# Patient Record
Sex: MALE | Race: WHITE | NOT HISPANIC OR LATINO
[De-identification: names, ages, dates, MRNs, and addresses within clinical notes are randomized per-mention and may not be internally consistent; named-entity substitution may affect disease eponyms.]

---

## 2022-11-15 ENCOUNTER — APPOINTMENT (OUTPATIENT)
Dept: ORTHOPEDIC SURGERY | Facility: CLINIC | Age: 72
End: 2022-11-15

## 2022-11-28 ENCOUNTER — APPOINTMENT (OUTPATIENT)
Dept: ORTHOPEDIC SURGERY | Facility: CLINIC | Age: 72
End: 2022-11-28

## 2022-12-02 ENCOUNTER — APPOINTMENT (OUTPATIENT)
Dept: ORTHOPEDIC SURGERY | Facility: CLINIC | Age: 72
End: 2022-12-02

## 2023-10-04 ENCOUNTER — EMERGENCY (EMERGENCY)
Facility: HOSPITAL | Age: 73
LOS: 0 days | Discharge: LEFT BEFORE DISCHARGE | End: 2023-10-04
Attending: STUDENT IN AN ORGANIZED HEALTH CARE EDUCATION/TRAINING PROGRAM
Payer: MEDICARE

## 2023-10-04 VITALS
HEART RATE: 158 BPM | OXYGEN SATURATION: 98 % | DIASTOLIC BLOOD PRESSURE: 133 MMHG | RESPIRATION RATE: 18 BRPM | TEMPERATURE: 99 F | HEIGHT: 72 IN | SYSTOLIC BLOOD PRESSURE: 169 MMHG | WEIGHT: 190.04 LBS

## 2023-10-04 VITALS
DIASTOLIC BLOOD PRESSURE: 106 MMHG | TEMPERATURE: 98 F | OXYGEN SATURATION: 97 % | SYSTOLIC BLOOD PRESSURE: 147 MMHG | RESPIRATION RATE: 15 BRPM | HEART RATE: 100 BPM

## 2023-10-04 DIAGNOSIS — R10.32 LEFT LOWER QUADRANT PAIN: ICD-10-CM

## 2023-10-04 DIAGNOSIS — M54.50 LOW BACK PAIN, UNSPECIFIED: ICD-10-CM

## 2023-10-04 DIAGNOSIS — I48.91 UNSPECIFIED ATRIAL FIBRILLATION: ICD-10-CM

## 2023-10-04 DIAGNOSIS — S72.009A FRACTURE OF UNSPECIFIED PART OF NECK OF UNSPECIFIED FEMUR, INITIAL ENCOUNTER FOR CLOSED FRACTURE: Chronic | ICD-10-CM

## 2023-10-04 DIAGNOSIS — Z86.19 PERSONAL HISTORY OF OTHER INFECTIOUS AND PARASITIC DISEASES: ICD-10-CM

## 2023-10-04 DIAGNOSIS — Z53.29 PROCEDURE AND TREATMENT NOT CARRIED OUT BECAUSE OF PATIENT'S DECISION FOR OTHER REASONS: ICD-10-CM

## 2023-10-04 DIAGNOSIS — Z87.81 PERSONAL HISTORY OF (HEALED) TRAUMATIC FRACTURE: ICD-10-CM

## 2023-10-04 DIAGNOSIS — R00.0 TACHYCARDIA, UNSPECIFIED: ICD-10-CM

## 2023-10-04 DIAGNOSIS — I48.92 UNSPECIFIED ATRIAL FLUTTER: ICD-10-CM

## 2023-10-04 DIAGNOSIS — Z87.2 PERSONAL HISTORY OF DISEASES OF THE SKIN AND SUBCUTANEOUS TISSUE: ICD-10-CM

## 2023-10-04 DIAGNOSIS — Z20.822 CONTACT WITH AND (SUSPECTED) EXPOSURE TO COVID-19: ICD-10-CM

## 2023-10-04 DIAGNOSIS — F11.20 OPIOID DEPENDENCE, UNCOMPLICATED: ICD-10-CM

## 2023-10-04 LAB
ALBUMIN SERPL ELPH-MCNC: 3.5 G/DL — SIGNIFICANT CHANGE UP (ref 3.3–5)
ALP SERPL-CCNC: 193 U/L — HIGH (ref 40–120)
ALT FLD-CCNC: 28 U/L — SIGNIFICANT CHANGE UP (ref 12–78)
ANION GAP SERPL CALC-SCNC: 8 MMOL/L — SIGNIFICANT CHANGE UP (ref 5–17)
APPEARANCE UR: CLEAR — SIGNIFICANT CHANGE UP
AST SERPL-CCNC: 30 U/L — SIGNIFICANT CHANGE UP (ref 15–37)
BASOPHILS # BLD AUTO: 0.02 K/UL — SIGNIFICANT CHANGE UP (ref 0–0.2)
BASOPHILS NFR BLD AUTO: 0.2 % — SIGNIFICANT CHANGE UP (ref 0–2)
BILIRUB SERPL-MCNC: 1.1 MG/DL — SIGNIFICANT CHANGE UP (ref 0.2–1.2)
BILIRUB UR-MCNC: NEGATIVE — SIGNIFICANT CHANGE UP
BUN SERPL-MCNC: 9 MG/DL — SIGNIFICANT CHANGE UP (ref 7–23)
CALCIUM SERPL-MCNC: 8.9 MG/DL — SIGNIFICANT CHANGE UP (ref 8.5–10.1)
CHLORIDE SERPL-SCNC: 97 MMOL/L — SIGNIFICANT CHANGE UP (ref 96–108)
CO2 SERPL-SCNC: 31 MMOL/L — SIGNIFICANT CHANGE UP (ref 22–31)
COLOR SPEC: YELLOW — SIGNIFICANT CHANGE UP
CREAT SERPL-MCNC: 0.82 MG/DL — SIGNIFICANT CHANGE UP (ref 0.5–1.3)
DIFF PNL FLD: NEGATIVE — SIGNIFICANT CHANGE UP
EGFR: 93 ML/MIN/1.73M2 — SIGNIFICANT CHANGE UP
EOSINOPHIL # BLD AUTO: 0.02 K/UL — SIGNIFICANT CHANGE UP (ref 0–0.5)
EOSINOPHIL NFR BLD AUTO: 0.2 % — SIGNIFICANT CHANGE UP (ref 0–6)
GLUCOSE SERPL-MCNC: 92 MG/DL — SIGNIFICANT CHANGE UP (ref 70–99)
GLUCOSE UR QL: NEGATIVE MG/DL — SIGNIFICANT CHANGE UP
HCT VFR BLD CALC: 45.6 % — SIGNIFICANT CHANGE UP (ref 39–50)
HGB BLD-MCNC: 15.5 G/DL — SIGNIFICANT CHANGE UP (ref 13–17)
IMM GRANULOCYTES NFR BLD AUTO: 0.5 % — SIGNIFICANT CHANGE UP (ref 0–0.9)
INR BLD: 1.02 RATIO — SIGNIFICANT CHANGE UP (ref 0.85–1.18)
KETONES UR-MCNC: ABNORMAL
LACTATE SERPL-SCNC: 2 MMOL/L — SIGNIFICANT CHANGE UP (ref 0.7–2)
LEUKOCYTE ESTERASE UR-ACNC: NEGATIVE — SIGNIFICANT CHANGE UP
LIDOCAIN IGE QN: 20 U/L — SIGNIFICANT CHANGE UP (ref 13–75)
LYMPHOCYTES # BLD AUTO: 1.47 K/UL — SIGNIFICANT CHANGE UP (ref 1–3.3)
LYMPHOCYTES # BLD AUTO: 18.3 % — SIGNIFICANT CHANGE UP (ref 13–44)
MAGNESIUM SERPL-MCNC: 2.2 MG/DL — SIGNIFICANT CHANGE UP (ref 1.6–2.6)
MCHC RBC-ENTMCNC: 32.4 PG — SIGNIFICANT CHANGE UP (ref 27–34)
MCHC RBC-ENTMCNC: 34 G/DL — SIGNIFICANT CHANGE UP (ref 32–36)
MCV RBC AUTO: 95.2 FL — SIGNIFICANT CHANGE UP (ref 80–100)
MONOCYTES # BLD AUTO: 1.15 K/UL — HIGH (ref 0–0.9)
MONOCYTES NFR BLD AUTO: 14.3 % — HIGH (ref 2–14)
NEUTROPHILS # BLD AUTO: 5.34 K/UL — SIGNIFICANT CHANGE UP (ref 1.8–7.4)
NEUTROPHILS NFR BLD AUTO: 66.5 % — SIGNIFICANT CHANGE UP (ref 43–77)
NITRITE UR-MCNC: NEGATIVE — SIGNIFICANT CHANGE UP
NRBC # BLD: 0 /100 WBCS — SIGNIFICANT CHANGE UP (ref 0–0)
PH UR: 7 — SIGNIFICANT CHANGE UP (ref 5–8)
PLATELET # BLD AUTO: 219 K/UL — SIGNIFICANT CHANGE UP (ref 150–400)
POTASSIUM SERPL-MCNC: 3.5 MMOL/L — SIGNIFICANT CHANGE UP (ref 3.5–5.3)
POTASSIUM SERPL-SCNC: 3.5 MMOL/L — SIGNIFICANT CHANGE UP (ref 3.5–5.3)
PROT SERPL-MCNC: 7.9 GM/DL — SIGNIFICANT CHANGE UP (ref 6–8.3)
PROT UR-MCNC: NEGATIVE MG/DL — SIGNIFICANT CHANGE UP
PROTHROM AB SERPL-ACNC: 12.1 SEC — SIGNIFICANT CHANGE UP (ref 9.5–13)
RAPID RVP RESULT: SIGNIFICANT CHANGE UP
RBC # BLD: 4.79 M/UL — SIGNIFICANT CHANGE UP (ref 4.2–5.8)
RBC # FLD: 12.4 % — SIGNIFICANT CHANGE UP (ref 10.3–14.5)
SARS-COV-2 RNA SPEC QL NAA+PROBE: SIGNIFICANT CHANGE UP
SODIUM SERPL-SCNC: 136 MMOL/L — SIGNIFICANT CHANGE UP (ref 135–145)
SP GR SPEC: 1.01 — SIGNIFICANT CHANGE UP (ref 1.01–1.02)
T4 AB SER-ACNC: 9.8 UG/DL — SIGNIFICANT CHANGE UP (ref 4.6–12)
TROPONIN I, HIGH SENSITIVITY RESULT: 8.7 NG/L — SIGNIFICANT CHANGE UP
TSH SERPL-MCNC: 1.26 UIU/ML — SIGNIFICANT CHANGE UP (ref 0.36–3.74)
UROBILINOGEN FLD QL: NEGATIVE MG/DL — SIGNIFICANT CHANGE UP
WBC # BLD: 8.04 K/UL — SIGNIFICANT CHANGE UP (ref 3.8–10.5)
WBC # FLD AUTO: 8.04 K/UL — SIGNIFICANT CHANGE UP (ref 3.8–10.5)

## 2023-10-04 PROCEDURE — 99284 EMERGENCY DEPT VISIT MOD MDM: CPT

## 2023-10-04 PROCEDURE — 93010 ELECTROCARDIOGRAM REPORT: CPT | Mod: 76

## 2023-10-04 PROCEDURE — 99291 CRITICAL CARE FIRST HOUR: CPT

## 2023-10-04 PROCEDURE — 71045 X-RAY EXAM CHEST 1 VIEW: CPT | Mod: 26

## 2023-10-04 RX ORDER — CYCLOBENZAPRINE HYDROCHLORIDE 10 MG/1
1 TABLET, FILM COATED ORAL
Qty: 15 | Refills: 0
Start: 2023-10-04 | End: 2023-10-08

## 2023-10-04 RX ORDER — ACETAMINOPHEN 500 MG
975 TABLET ORAL ONCE
Refills: 0 | Status: DISCONTINUED | OUTPATIENT
Start: 2023-10-04 | End: 2023-10-04

## 2023-10-04 RX ORDER — ACETAMINOPHEN 500 MG
1000 TABLET ORAL ONCE
Refills: 0 | Status: COMPLETED | OUTPATIENT
Start: 2023-10-04 | End: 2023-10-04

## 2023-10-04 RX ORDER — DILTIAZEM HCL 120 MG
10 CAPSULE, EXT RELEASE 24 HR ORAL ONCE
Refills: 0 | Status: COMPLETED | OUTPATIENT
Start: 2023-10-04 | End: 2023-10-04

## 2023-10-04 RX ORDER — SODIUM CHLORIDE 9 MG/ML
1000 INJECTION INTRAMUSCULAR; INTRAVENOUS; SUBCUTANEOUS ONCE
Refills: 0 | Status: DISCONTINUED | OUTPATIENT
Start: 2023-10-04 | End: 2023-10-04

## 2023-10-04 RX ORDER — METOPROLOL TARTRATE 50 MG
1 TABLET ORAL
Qty: 60 | Refills: 0
Start: 2023-10-04 | End: 2023-11-02

## 2023-10-04 RX ORDER — PIPERACILLIN AND TAZOBACTAM 4; .5 G/20ML; G/20ML
3.38 INJECTION, POWDER, LYOPHILIZED, FOR SOLUTION INTRAVENOUS ONCE
Refills: 0 | Status: COMPLETED | OUTPATIENT
Start: 2023-10-04 | End: 2023-10-04

## 2023-10-04 RX ORDER — SODIUM CHLORIDE 9 MG/ML
2400 INJECTION, SOLUTION INTRAVENOUS ONCE
Refills: 0 | Status: COMPLETED | OUTPATIENT
Start: 2023-10-04 | End: 2023-10-04

## 2023-10-04 RX ORDER — DILTIAZEM HCL 120 MG
20 CAPSULE, EXT RELEASE 24 HR ORAL ONCE
Refills: 0 | Status: COMPLETED | OUTPATIENT
Start: 2023-10-04 | End: 2023-10-04

## 2023-10-04 RX ORDER — DILTIAZEM HCL 120 MG
60 CAPSULE, EXT RELEASE 24 HR ORAL ONCE
Refills: 0 | Status: COMPLETED | OUTPATIENT
Start: 2023-10-04 | End: 2023-10-04

## 2023-10-04 RX ADMIN — PIPERACILLIN AND TAZOBACTAM 200 GRAM(S): 4; .5 INJECTION, POWDER, LYOPHILIZED, FOR SOLUTION INTRAVENOUS at 09:35

## 2023-10-04 RX ADMIN — Medication 60 MILLIGRAM(S): at 11:06

## 2023-10-04 RX ADMIN — PIPERACILLIN AND TAZOBACTAM 3.38 GRAM(S): 4; .5 INJECTION, POWDER, LYOPHILIZED, FOR SOLUTION INTRAVENOUS at 10:05

## 2023-10-04 RX ADMIN — Medication 1000 MILLIGRAM(S): at 08:15

## 2023-10-04 RX ADMIN — Medication 10 MILLIGRAM(S): at 09:45

## 2023-10-04 RX ADMIN — Medication 400 MILLIGRAM(S): at 08:00

## 2023-10-04 RX ADMIN — Medication 20 MILLIGRAM(S): at 10:16

## 2023-10-04 RX ADMIN — SODIUM CHLORIDE 2400 MILLILITER(S): 9 INJECTION, SOLUTION INTRAVENOUS at 09:05

## 2023-10-04 RX ADMIN — SODIUM CHLORIDE 2400 MILLILITER(S): 9 INJECTION, SOLUTION INTRAVENOUS at 08:05

## 2023-10-04 NOTE — ED PROVIDER NOTE - CRITICAL CARE ATTENDING CONTRIBUTION TO CARE
Upon my evaluation, this patient had a high probability of imminent or life-threatening deterioration due to atrial flutter w/ RVR, sepsis, which required my direct attention, intervention, and personal management.  The patient has a  medical condition that impairs one or more vital organ systems.  Frequent personal assessment and adjustment of medical interventions was performed.      I have personally provided 30 minutes of critical care time exclusive of time spent on separately billable procedures. Time includes review of laboratory data, radiology results, discussion with consultants, patient and family; monitoring for potential decompensation, as well as time spent retrieving data and reviewing the chart and documenting the visit. Interventions were performed as documented above.

## 2023-10-04 NOTE — ED PROVIDER NOTE - CARE PROVIDER_API CALL
Agus Shrestha  Cardiology  [address] [place] [address]  Phone: [phone]  Fax: [phone]  Follow Up Time: 1-3 Days

## 2023-10-04 NOTE — ED PROVIDER NOTE - NSFOLLOWUPINSTRUCTIONS_ED_ALL_ED_FT
Followup with cardiologist in the next 1-3 days.     Palpitations    A palpitation is the feeling that your heartbeat is irregular or is faster than normal. It may feel like your heart is fluttering or skipping a beat. They may be caused by many things, including smoking, caffeine, alcohol, stress, and certain medicines. Although most causes of palpitations are not serious, palpitations can be a sign of a serious medical problem. Avoid caffeine, alcohol, and tobacco products at home. Try to reduce stress and anxiety and make sure to get plenty of rest.     SEEK IMMEDIATE MEDICAL CARE IF YOU HAVE ANY OF THE FOLLOWING SYMPTOMS: chest pain, shortness of breath, severe headache, dizziness/lightheadedness, or fainting.   Please return to the emergency department immediately should you feel worse in any way or have any of the following symptoms:    •	especially increased or different pain  •	 fevers  •	persistent vomiting  •	shaking chills     Please follow up with the Doctor listed within the time frame specified. Thank you for coming to the emergency department. We hope you are feeling improved and continue to get better. Have a nice day.

## 2023-10-04 NOTE — CONSULT NOTE ADULT - ASSESSMENT
73 years old male with h/o methadone dependence present to ED with complain of low back pain radiage to left groin area, which happen intermittently for last 1 week. Pain is slightly worsened with ambulation or certain position. Denied any fever, chills, nausea, vomiting, diarrhea. No urinary symptoms.  No respiratory symptoms  Slightly hypertensive, tachycardic to 158 in ED. EKG appear Afib/flutter with variable block. Some improvement in HR with multiple dose of diltiazem. Had one episode of low grade fever of 100.4. No leukocytosis, plt 219, lactate 2, K 3.5, Cr 0.82. UA negative for UTI. RVP negative. CXR  ( I personally review) with no focal consolidation      Initial EKG and repeat EKG reviewed  ( I personally review), appear to have Afib/Flutter with variable block. Patient denied any h/o CAD or CVA. No chest pain on exertion. No palpitation. Still have intermittent tachycardic to 120s range. Discussed patient the need of admission for further HR control, work up for underlying cardiac pathology and low grade fever. Patient stated that he dose not want to stay and want to leave hospital. Explained patient that it will be against medical advise if he leaves and I strongly recommend against it. Risk of leaving AMA including but not limiting to infection, sepsis, syncope, cardiac arrhythmia and possible death were all explained to patient. Patient expressed verbal standing and still insists to leave AMA. AMA form signed, witness by ED team and placed in chart. CHADVASE 1 at lease for age. Inform patient that at least he should be on aspirin, which he has at home and he said he would take it. Metoprolol 25mg bid sent to patient's preferred pharmacy.   Discussed with ED team

## 2023-10-04 NOTE — CONSULT NOTE ADULT - SUBJECTIVE AND OBJECTIVE BOX
73 years old male with h/o methadone dependence present to ED with complain of low back pain radiage to left groin area, which happen intermittently for last 1 week. Pain is slightly worsened with ambulation or certain position. Denied any fever, chills, nausea, vomiting, diarrhea. No urinary symptoms.   Slightly hypertensive, tachycardic to 158 in ED. EKG appear Afib/flutter with variable block. Some improvement in HR with multiple dose of diltiazem. Had one episode of low grade fever of 100.4. No leukocytosis, plt 219, lactate 2, K 3.5, Cr 0.82. UA negative for UTI. RVP negative. CXR with no focal consolidation    SH: no toxic habits. H/o opiate use 50 years ago on methadone  FH: no family h/o HTN, DM, CAD    ROS: All ROS were negative except intermittent low back pain radiate to left groin    Physical Exam  CONSTITUTIONAL: alert and cooperative, no acute distress. Tachycardic  EYES: PERRL, no scleral icterus  ENT: Mucosa moist, tongue normal  NECK: Neck supple, trachea midline, non-tender  CARDIAC:  Irregular HR. No murmurs. No Pedal edema  LUNGS: Equal air entry both lungs. No rales, rhonchi, wheezing. Normal respiratory effort.   ABDOMEN: Soft, nondistended, nontender. No guarding or rebound tenderness. No hepatomegaly or splenomegaly. Bowel sound normal.  MUSCULOSKELETAL: Normocephalic, atraumatic. Spine normal without deformity or tenderness, no CVA tenderness. No significant deformity or joint abnormality.   NEUROLOGICAL: No gross motor or sensory deficits  PSYCHIATRIC: A&O x 3, appropriate mood and affect.

## 2023-10-04 NOTE — ED ADULT TRIAGE NOTE - CHIEF COMPLAINT QUOTE
left hip pain radiating to his left groin area, denies any trauma, reports taking water pill. Tachycardia at 156 in triage.

## 2023-10-04 NOTE — ED ADULT NURSE NOTE - NSFALLRISKINTERV_ED_ALL_ED

## 2023-10-04 NOTE — ED PROVIDER NOTE - CLINICAL SUMMARY MEDICAL DECISION MAKING FREE TEXT BOX
Patient is normotensive, well-appearing.  ANO x3.  Tachycardic to the 150s on arrival.  Rectal exam 100.4 temperature.  P waves visualized on EKG, consider sinus tachycardia.  Consider atrial flutter, 2-1 block as well given persistent heart rate in the 150s with some fluctuance down into the 120s.  Will give antipyretics, 30 cc/kg fluid bolus as part of sepsis work-up.  Will give rate control medicine as needed if heart rate does not improve with fluids and antipyretics.  Will treat underlying source.  Abdomen is benign on my exam, soft, nondistended, no tenderness.  Consider colitis, viral syndrome, kidney stone.

## 2023-10-04 NOTE — ED PROVIDER NOTE - PATIENT PORTAL LINK FT
You can access the FollowMyHealth Patient Portal offered by [facility] by registering at the following website: [email or web address]. By joining [employer]’s FollowMyHealth portal, you will also be able to view your health information using other applications (apps) compatible with our system.

## 2023-10-04 NOTE — ED ADULT NURSE NOTE - OBJECTIVE STATEMENT
72 yo male, A&Ox4, presents to ED c/o L lower quadrant, left hip pain radiating to his left groin area, denies any injury/ trauma, reports taking water pill for swelling to RENE LE. Tachycardic and febrile on arrival. Denies n/v/diarrhea sob, chest pain, dizziness, cough. Takes methadone.

## 2023-10-04 NOTE — ED PROVIDER NOTE - OBJECTIVE STATEMENT
73-year-old male history of lymphedema of lower extremities presents left lower quadrant abdominal pain for the past 1 week.  Patient states he has had back pain bilateral nature for the past 2 weeks.  Endorsing feeling knot-like sensation in the left lower quadrant of the abdomen.  States that he believes it is from indigestion.  Passing bowel movements, denies nausea or vomiting.  Denies chest pain or shortness of breath.  Denies dysuria.  Denies trauma or falls.  Denies urinary or fecal incontinence.  Patient does endorse taking methadone daily.  Denies cough, runny nose.  Denies any chest pain or palpitations currently.

## 2023-10-04 NOTE — ED PROVIDER NOTE - PHYSICAL EXAMINATION
General: Well appearing male in no acute distress  HEENT: Normocephalic, atraumatic. Moist mucous membranes. Oropharynx clear. No lymphadenopathy.  Eyes: No scleral icterus. EOMI. ELINOR.  Neck:. Soft and supple. Full ROM without pain. No midline tenderness  Cardiac: Regular rate and regular rhythm. No murmurs, rubs, gallops. Peripheral pulses 2+ and symmetric. No LE edema.  Resp: Lungs CTAB. Speaking in full sentences. No wheezes, rales or rhonchi.  Abd: Soft, non-tender, non-distended. No guarding or rebound. No scars, masses, or lesions.  Back: Spine midline and non-tender. No CVA tenderness.    Skin: No rashes, abrasions, or lacerations.  Neuro: AO x 3. Moves all extremities symmetrically. Motor strength and sensation grossly intact.

## 2023-10-04 NOTE — ED PROVIDER NOTE - PROGRESS NOTE DETAILS
patient refusing CT scan, states he does not want to lie flat for the CT scan. informed patient it is a test we recommend to find out what is going on with his abdomen. abdominal exam is soft, non-tender, nondistended but he was endorsing LLQ abdominal pain. patient made aware of benefits/risks of CT scan, he is A&Ox3 and able to make an informed decision. HR still elevated s/p 1L fluid bolus, will give dilt for rate control.   -Mendez spoke to Dr. stevenson, hospitalist. repeat EKG improved, HR fluctuates between 90s-120s. Dr. Call and myself at bedside. patient requesting to AMA. A&ox3. despite multiple efforts to convince patient to be admitted, patient is refusing. states he will followup with primary care doctor/cardiologist on his own. DR. call to send medicine to his pharmacy. he is made aware of benefits/risks and is able to make an informed decision. patient has no chest pain/sob/palpitations currently.   The pt has demonstrated concrete thinking/reasoning, has maintained an orderly/reasonable conversation, appears to have intact insight/judgment/reason and therefore in our opinion has capacity to make decisions. The pt verbalized an understanding of our worries. We’ve told the patient that the hospital evaluation is incomplete & many troublesome conditions haven’t  been r/o. We have discussed the need for further inpatient w/u so we can get more information. We have discussed the range of possible dx, potential testing & tx options. We’ve made  numerous efforts to prevent the pt from leaving AMA.  Our discussions included the potential outcomes of leaving AMA, including worsening of their condition, becoming permanently disabled/in pain/critically ill, or death.  Despite these efforts, we were unable to convince the pt to stay. The pt is refusing any  further care and is leaving against medical advice. We have attempted to offer tx/rx/guidance for any dangerous conditions which are most likely and/or dangerous.  We have answered all questions and have implored the pt to return ASAP to complete the w/u.

## 2023-10-04 NOTE — ED ADULT TRIAGE NOTE - BMI (KG/M2)
- likely second to septic shock and decreased po intake  - 2.8L fluid given  - strict I/O  - avoid nephrotoxic meds  - renal US if renal function worsening  - BMP monitoring daily  - UA  - Urine Na, Cr, Urea ordered   25.8

## 2023-10-05 LAB
CULTURE RESULTS: NO GROWTH — SIGNIFICANT CHANGE UP
SPECIMEN SOURCE: SIGNIFICANT CHANGE UP

## 2023-10-09 LAB
CULTURE RESULTS: SIGNIFICANT CHANGE UP
CULTURE RESULTS: SIGNIFICANT CHANGE UP
SPECIMEN SOURCE: SIGNIFICANT CHANGE UP
SPECIMEN SOURCE: SIGNIFICANT CHANGE UP

## 2023-11-01 ENCOUNTER — APPOINTMENT (OUTPATIENT)
Dept: CARDIOLOGY | Facility: CLINIC | Age: 73
End: 2023-11-01

## 2023-12-07 ENCOUNTER — INPATIENT (INPATIENT)
Facility: HOSPITAL | Age: 73
LOS: 3 days | Discharge: HOME | End: 2023-12-11
Attending: INTERNAL MEDICINE | Admitting: INTERNAL MEDICINE
Payer: MEDICARE

## 2023-12-07 VITALS
DIASTOLIC BLOOD PRESSURE: 107 MMHG | SYSTOLIC BLOOD PRESSURE: 136 MMHG | TEMPERATURE: 98 F | WEIGHT: 179.9 LBS | HEART RATE: 148 BPM | RESPIRATION RATE: 20 BRPM | HEIGHT: 72 IN | OXYGEN SATURATION: 97 %

## 2023-12-07 DIAGNOSIS — S72.009A FRACTURE OF UNSPECIFIED PART OF NECK OF UNSPECIFIED FEMUR, INITIAL ENCOUNTER FOR CLOSED FRACTURE: Chronic | ICD-10-CM

## 2023-12-07 LAB
ALBUMIN SERPL ELPH-MCNC: 3.3 G/DL — SIGNIFICANT CHANGE UP (ref 3.3–5)
ALBUMIN SERPL ELPH-MCNC: 3.3 G/DL — SIGNIFICANT CHANGE UP (ref 3.3–5)
ALP SERPL-CCNC: 125 U/L — HIGH (ref 40–120)
ALP SERPL-CCNC: 125 U/L — HIGH (ref 40–120)
ALT FLD-CCNC: 30 U/L — SIGNIFICANT CHANGE UP (ref 12–78)
ALT FLD-CCNC: 30 U/L — SIGNIFICANT CHANGE UP (ref 12–78)
ANION GAP SERPL CALC-SCNC: 8 MMOL/L — SIGNIFICANT CHANGE UP (ref 5–17)
ANION GAP SERPL CALC-SCNC: 8 MMOL/L — SIGNIFICANT CHANGE UP (ref 5–17)
APTT BLD: 37.5 SEC — HIGH (ref 24.5–35.6)
APTT BLD: 37.5 SEC — HIGH (ref 24.5–35.6)
AST SERPL-CCNC: 31 U/L — SIGNIFICANT CHANGE UP (ref 15–37)
AST SERPL-CCNC: 31 U/L — SIGNIFICANT CHANGE UP (ref 15–37)
BILIRUB SERPL-MCNC: 0.7 MG/DL — SIGNIFICANT CHANGE UP (ref 0.2–1.2)
BILIRUB SERPL-MCNC: 0.7 MG/DL — SIGNIFICANT CHANGE UP (ref 0.2–1.2)
BUN SERPL-MCNC: 9 MG/DL — SIGNIFICANT CHANGE UP (ref 7–23)
BUN SERPL-MCNC: 9 MG/DL — SIGNIFICANT CHANGE UP (ref 7–23)
CALCIUM SERPL-MCNC: 8.9 MG/DL — SIGNIFICANT CHANGE UP (ref 8.5–10.1)
CALCIUM SERPL-MCNC: 8.9 MG/DL — SIGNIFICANT CHANGE UP (ref 8.5–10.1)
CHLORIDE SERPL-SCNC: 103 MMOL/L — SIGNIFICANT CHANGE UP (ref 96–108)
CHLORIDE SERPL-SCNC: 103 MMOL/L — SIGNIFICANT CHANGE UP (ref 96–108)
CO2 SERPL-SCNC: 26 MMOL/L — SIGNIFICANT CHANGE UP (ref 22–31)
CO2 SERPL-SCNC: 26 MMOL/L — SIGNIFICANT CHANGE UP (ref 22–31)
CREAT SERPL-MCNC: 0.75 MG/DL — SIGNIFICANT CHANGE UP (ref 0.5–1.3)
CREAT SERPL-MCNC: 0.75 MG/DL — SIGNIFICANT CHANGE UP (ref 0.5–1.3)
EGFR: 96 ML/MIN/1.73M2 — SIGNIFICANT CHANGE UP
EGFR: 96 ML/MIN/1.73M2 — SIGNIFICANT CHANGE UP
GLUCOSE SERPL-MCNC: 129 MG/DL — HIGH (ref 70–99)
GLUCOSE SERPL-MCNC: 129 MG/DL — HIGH (ref 70–99)
INR BLD: 1.04 RATIO — SIGNIFICANT CHANGE UP (ref 0.85–1.18)
INR BLD: 1.04 RATIO — SIGNIFICANT CHANGE UP (ref 0.85–1.18)
NT-PROBNP SERPL-SCNC: 5220 PG/ML — HIGH (ref 0–125)
NT-PROBNP SERPL-SCNC: 5220 PG/ML — HIGH (ref 0–125)
POTASSIUM SERPL-MCNC: 4.1 MMOL/L — SIGNIFICANT CHANGE UP (ref 3.5–5.3)
POTASSIUM SERPL-MCNC: 4.1 MMOL/L — SIGNIFICANT CHANGE UP (ref 3.5–5.3)
POTASSIUM SERPL-SCNC: 4.1 MMOL/L — SIGNIFICANT CHANGE UP (ref 3.5–5.3)
POTASSIUM SERPL-SCNC: 4.1 MMOL/L — SIGNIFICANT CHANGE UP (ref 3.5–5.3)
PROT SERPL-MCNC: 7.4 GM/DL — SIGNIFICANT CHANGE UP (ref 6–8.3)
PROT SERPL-MCNC: 7.4 GM/DL — SIGNIFICANT CHANGE UP (ref 6–8.3)
PROTHROM AB SERPL-ACNC: 12.5 SEC — SIGNIFICANT CHANGE UP (ref 9.5–13)
PROTHROM AB SERPL-ACNC: 12.5 SEC — SIGNIFICANT CHANGE UP (ref 9.5–13)
SODIUM SERPL-SCNC: 137 MMOL/L — SIGNIFICANT CHANGE UP (ref 135–145)
SODIUM SERPL-SCNC: 137 MMOL/L — SIGNIFICANT CHANGE UP (ref 135–145)
TROPONIN I, HIGH SENSITIVITY RESULT: 22.7 NG/L — SIGNIFICANT CHANGE UP
TROPONIN I, HIGH SENSITIVITY RESULT: 22.7 NG/L — SIGNIFICANT CHANGE UP

## 2023-12-07 PROCEDURE — 99285 EMERGENCY DEPT VISIT HI MDM: CPT

## 2023-12-07 PROCEDURE — 93010 ELECTROCARDIOGRAM REPORT: CPT

## 2023-12-07 PROCEDURE — 93970 EXTREMITY STUDY: CPT | Mod: 26

## 2023-12-07 RX ORDER — ACETAMINOPHEN 500 MG
1000 TABLET ORAL ONCE
Refills: 0 | Status: COMPLETED | OUTPATIENT
Start: 2023-12-07 | End: 2023-12-07

## 2023-12-07 RX ORDER — METHADONE HYDROCHLORIDE 40 MG/1
10 TABLET ORAL ONCE
Refills: 0 | Status: DISCONTINUED | OUTPATIENT
Start: 2023-12-07 | End: 2023-12-07

## 2023-12-07 RX ORDER — SODIUM CHLORIDE 9 MG/ML
500 INJECTION, SOLUTION INTRAVENOUS ONCE
Refills: 0 | Status: COMPLETED | OUTPATIENT
Start: 2023-12-07 | End: 2023-12-07

## 2023-12-07 RX ADMIN — Medication 400 MILLIGRAM(S): at 22:11

## 2023-12-07 RX ADMIN — SODIUM CHLORIDE 1000 MILLILITER(S): 9 INJECTION, SOLUTION INTRAVENOUS at 22:13

## 2023-12-07 RX ADMIN — METHADONE HYDROCHLORIDE 10 MILLIGRAM(S): 40 TABLET ORAL at 23:50

## 2023-12-07 RX ADMIN — SODIUM CHLORIDE 500 MILLILITER(S): 9 INJECTION, SOLUTION INTRAVENOUS at 23:11

## 2023-12-07 RX ADMIN — Medication 1000 MILLIGRAM(S): at 22:45

## 2023-12-07 RX ADMIN — Medication 1000 MILLIGRAM(S): at 23:11

## 2023-12-07 NOTE — ED ADULT NURSE NOTE - ED STAT RN HANDOFF DETAILS
REPORT GIVEN TO CARMENZA AGUILAR. UPDATED ON PT STATUS AND POC. PT AOX4, VSS, SKIN PWD, PIV PATENT AND INTACT. WILL PREPARE FOR TRANSFER.

## 2023-12-07 NOTE — ED ADULT NURSE NOTE - OBJECTIVE STATEMENT
pt c/o b/l leg pain says he take methadone for the pain but doesn't think it is working anymore.  Pt appears anxious stating "I just need medication" vss

## 2023-12-07 NOTE — ED ADULT TRIAGE NOTE - CHIEF COMPLAINT QUOTE
Pt c/o Difficulty of breathing. Pt said he might pass out. Pt pale in appearance. Walked with cane. Pt c/o Difficulty of breathing. Pt said he might pass out. Pt pale in appearance. Walked with cane. Pt c/o bilateral knee pain. Pt c/o Difficulty of breathing. Pt said he might pass out. Pt pale in appearance. Walked with cane. Pt c/o bilateral knee pain. On Methadone last time he took was yesterday. Pt put on CM. EKG done

## 2023-12-07 NOTE — ED ADULT NURSE NOTE - NSFALLRISKINTERV_ED_ALL_ED
Assistance OOB with selected safe patient handling equipment if applicable/Assistance with ambulation/Communicate fall risk and risk factors to all staff, patient, and family/Monitor gait and stability/Provide patient with walking aids/Provide visual cue: yellow wristband, yellow gown, etc/Reinforce activity limits and safety measures with patient and family/Call bell, personal items and telephone in reach/Instruct patient to call for assistance before getting out of bed/chair/stretcher/Non-slip footwear applied when patient is off stretcher/[place] to call system/Physically safe environment - no spills, clutter or unnecessary equipment/Purposeful Proactive Rounding/Room/bathroom lighting operational, light cord in reach Assistance OOB with selected safe patient handling equipment if applicable/Assistance with ambulation/Communicate fall risk and risk factors to all staff, patient, and family/Monitor gait and stability/Provide patient with walking aids/Provide visual cue: yellow wristband, yellow gown, etc/Reinforce activity limits and safety measures with patient and family/Call bell, personal items and telephone in reach/Instruct patient to call for assistance before getting out of bed/chair/stretcher/Non-slip footwear applied when patient is off stretcher/[place] to call system/Physically safe environment - no spills, clutter or unnecessary equipment/Purposeful Proactive Rounding/Room/bathroom lighting operational, light cord in reach

## 2023-12-07 NOTE — ED PROVIDER NOTE - CLINICAL SUMMARY MEDICAL DECISION MAKING FREE TEXT BOX
This is a patient with a past medical history of chronic pain on methadone for many years and gets weekly take-home.  Patient states that he does not know if he has a history of atrial fibrillation upon chart review it appears he was seen here in October his heart rate was elevated he was here for a separate complaint therefore he was given diltiazem.  He did sign out AMA on that visit.  Patient states for the past week or so he is having increasing pain in his bilateral knees he does state that he had an injury a year ago and has been having worsening pain since then.  However he states that for the past week he has been noticing waking up with shortness of breath he denies any feeling of palpitations he denies any chest pain he states it comes and goes.  No recent travel.  He states there is a slight increase in swelling of both of his lower extremities.  No fevers no chills no nausea no vomiting no diarrhea.  Of note patient states that he has been taking his methadone more frequently than he should because he has been experiencing a lot of pain.  He states that he was told to follow-up with the pain clinic but when he tries to they tell him that he should follow-up with his methadone clinic.  Patient states he has 1 more dose of his methadone until he is seen again in the clinic on Monday.  May be a component of withdrawal influencing the tachy, I did attempt to call his methadone clinic however there was no answer so I left a message.  For now we will give fluids and IV pain medications.  Will obtain a follow-up chest x-ray for cardiac workup.  Will reassess if patient is still tachycardic will consider administering a dose of diltiazem it appears he did receive a dose of diltiazem upon last visit.  ekg a fib with RVR however feel there may be a component of withdrawal.     General: Well appearing, well nourished, in no distress  Head: Normocephalic, atraumatic  Eyes: Conjunctiva clear, sclera non-icteric  Mouth: Mucous membranes moist  Neck: Supple  Heart: tachy irreg   Lungs: Clear to auscultation  Abdomen: soft, no tenderness, non distended, no organomegaly  Back: Spine normal without deformity or tenderness, no CVA tenderness  Extremities: No amputations or deformities, cyanosis, edema  Musculoskeletal: No crepitation, defects or decreased range of motion, strength intact throughout, pulses intact  Neurologic: No gross deficits   Psychiatric: Oriented X3, normal mood and affect  Skin: Warm,dry. Good turgor, no rash, unusual bruising, Cap refill <2 seconds

## 2023-12-07 NOTE — ED PROVIDER NOTE - PROGRESS NOTE DETAILS
He feels slightly better after the 10 mg of methadone.  Patient did show me his bottle ( dated to be taken 12/10)which confirms the 90 mg dosing.  I did speak with pharmacy and they are agreeable to give an additional 30 mg at this time and if he is still showing symptoms in 2 hours can complete his full 90 mg dose.  Will reassess after we have given more medication to determine if this is secondary to withdrawal.  Patient remains tachycardic despite having improvement of pain will consider giving dose of Cardizem.  Did speak with patient who is agreeable to be admitted for cardiac workup as he has nt undergone one in the past.    Pending second Trop.  Signed out to oncoming team. pt signed out to me by Dr. Bo: pending reassessment of pain and labs. pt stil lhave signs of withdrawal, will finish final 50mg of methadone. pending albs will likely require admission to medicine for cardiac workup pt withdrawal sxs improved after methadone last dose. still in afib in 120s, will give dilt 10mg IV with tablet afterwars and admit to tele

## 2023-12-07 NOTE — ED ADULT NURSE NOTE - CHIEF COMPLAINT QUOTE
Pt c/o Difficulty of breathing. Pt said he might pass out. Pt pale in appearance. Walked with cane. Pt c/o bilateral knee pain. On Methadone last time he took was yesterday. Pt put on CM. EKG done

## 2023-12-08 LAB
BASOPHILS # BLD AUTO: 0.02 K/UL — SIGNIFICANT CHANGE UP (ref 0–0.2)
BASOPHILS # BLD AUTO: 0.02 K/UL — SIGNIFICANT CHANGE UP (ref 0–0.2)
BASOPHILS NFR BLD AUTO: 0.2 % — SIGNIFICANT CHANGE UP (ref 0–2)
BASOPHILS NFR BLD AUTO: 0.2 % — SIGNIFICANT CHANGE UP (ref 0–2)
EOSINOPHIL # BLD AUTO: 0 K/UL — SIGNIFICANT CHANGE UP (ref 0–0.5)
EOSINOPHIL # BLD AUTO: 0 K/UL — SIGNIFICANT CHANGE UP (ref 0–0.5)
EOSINOPHIL NFR BLD AUTO: 0 % — SIGNIFICANT CHANGE UP (ref 0–6)
EOSINOPHIL NFR BLD AUTO: 0 % — SIGNIFICANT CHANGE UP (ref 0–6)
HAV IGM SER-ACNC: SIGNIFICANT CHANGE UP
HAV IGM SER-ACNC: SIGNIFICANT CHANGE UP
HBV CORE IGM SER-ACNC: SIGNIFICANT CHANGE UP
HBV CORE IGM SER-ACNC: SIGNIFICANT CHANGE UP
HBV SURFACE AG SER-ACNC: SIGNIFICANT CHANGE UP
HBV SURFACE AG SER-ACNC: SIGNIFICANT CHANGE UP
HCT VFR BLD CALC: 43.5 % — SIGNIFICANT CHANGE UP (ref 39–50)
HCT VFR BLD CALC: 43.5 % — SIGNIFICANT CHANGE UP (ref 39–50)
HCV AB S/CO SERPL IA: 13.48 S/CO — HIGH (ref 0–0.99)
HCV AB S/CO SERPL IA: 13.48 S/CO — HIGH (ref 0–0.99)
HCV AB SERPL-IMP: REACTIVE
HCV AB SERPL-IMP: REACTIVE
HCV RNA FLD QL NAA+PROBE: SIGNIFICANT CHANGE UP
HCV RNA FLD QL NAA+PROBE: SIGNIFICANT CHANGE UP
HCV RNA SERPL NAA DL=5-ACNC: SIGNIFICANT CHANGE UP IU/ML
HCV RNA SERPL NAA DL=5-ACNC: SIGNIFICANT CHANGE UP IU/ML
HCV RNA SPEC NAA+PROBE-LOG IU: 5.76 LOGIU/ML — SIGNIFICANT CHANGE UP
HCV RNA SPEC NAA+PROBE-LOG IU: 5.76 LOGIU/ML — SIGNIFICANT CHANGE UP
HCV RNA SPEC NAA+PROBE-LOG IU: DETECTED
HCV RNA SPEC NAA+PROBE-LOG IU: DETECTED
HCV RNA SPEC QL PROBE+SIG AMP: DETECTED
HCV RNA SPEC QL PROBE+SIG AMP: DETECTED
HGB BLD-MCNC: 14.5 G/DL — SIGNIFICANT CHANGE UP (ref 13–17)
HGB BLD-MCNC: 14.5 G/DL — SIGNIFICANT CHANGE UP (ref 13–17)
IMM GRANULOCYTES NFR BLD AUTO: 0.2 % — SIGNIFICANT CHANGE UP (ref 0–0.9)
IMM GRANULOCYTES NFR BLD AUTO: 0.2 % — SIGNIFICANT CHANGE UP (ref 0–0.9)
LYMPHOCYTES # BLD AUTO: 0.91 K/UL — LOW (ref 1–3.3)
LYMPHOCYTES # BLD AUTO: 0.91 K/UL — LOW (ref 1–3.3)
LYMPHOCYTES # BLD AUTO: 10.5 % — LOW (ref 13–44)
LYMPHOCYTES # BLD AUTO: 10.5 % — LOW (ref 13–44)
MAGNESIUM SERPL-MCNC: 2.3 MG/DL — SIGNIFICANT CHANGE UP (ref 1.6–2.6)
MAGNESIUM SERPL-MCNC: 2.3 MG/DL — SIGNIFICANT CHANGE UP (ref 1.6–2.6)
MCHC RBC-ENTMCNC: 31.3 PG — SIGNIFICANT CHANGE UP (ref 27–34)
MCHC RBC-ENTMCNC: 31.3 PG — SIGNIFICANT CHANGE UP (ref 27–34)
MCHC RBC-ENTMCNC: 33.3 G/DL — SIGNIFICANT CHANGE UP (ref 32–36)
MCHC RBC-ENTMCNC: 33.3 G/DL — SIGNIFICANT CHANGE UP (ref 32–36)
MCV RBC AUTO: 94 FL — SIGNIFICANT CHANGE UP (ref 80–100)
MCV RBC AUTO: 94 FL — SIGNIFICANT CHANGE UP (ref 80–100)
MONOCYTES # BLD AUTO: 0.54 K/UL — SIGNIFICANT CHANGE UP (ref 0–0.9)
MONOCYTES # BLD AUTO: 0.54 K/UL — SIGNIFICANT CHANGE UP (ref 0–0.9)
MONOCYTES NFR BLD AUTO: 6.3 % — SIGNIFICANT CHANGE UP (ref 2–14)
MONOCYTES NFR BLD AUTO: 6.3 % — SIGNIFICANT CHANGE UP (ref 2–14)
NEUTROPHILS # BLD AUTO: 7.15 K/UL — SIGNIFICANT CHANGE UP (ref 1.8–7.4)
NEUTROPHILS # BLD AUTO: 7.15 K/UL — SIGNIFICANT CHANGE UP (ref 1.8–7.4)
NEUTROPHILS NFR BLD AUTO: 82.8 % — HIGH (ref 43–77)
NEUTROPHILS NFR BLD AUTO: 82.8 % — HIGH (ref 43–77)
NRBC # BLD: 0 /100 WBCS — SIGNIFICANT CHANGE UP (ref 0–0)
NRBC # BLD: 0 /100 WBCS — SIGNIFICANT CHANGE UP (ref 0–0)
PHOSPHATE SERPL-MCNC: 3.5 MG/DL — SIGNIFICANT CHANGE UP (ref 2.5–4.5)
PHOSPHATE SERPL-MCNC: 3.5 MG/DL — SIGNIFICANT CHANGE UP (ref 2.5–4.5)
PLATELET # BLD AUTO: 219 K/UL — SIGNIFICANT CHANGE UP (ref 150–400)
PLATELET # BLD AUTO: 219 K/UL — SIGNIFICANT CHANGE UP (ref 150–400)
RBC # BLD: 4.63 M/UL — SIGNIFICANT CHANGE UP (ref 4.2–5.8)
RBC # BLD: 4.63 M/UL — SIGNIFICANT CHANGE UP (ref 4.2–5.8)
RBC # FLD: 13.2 % — SIGNIFICANT CHANGE UP (ref 10.3–14.5)
RBC # FLD: 13.2 % — SIGNIFICANT CHANGE UP (ref 10.3–14.5)
TSH SERPL-MCNC: 0.83 UIU/ML — SIGNIFICANT CHANGE UP (ref 0.36–3.74)
TSH SERPL-MCNC: 0.83 UIU/ML — SIGNIFICANT CHANGE UP (ref 0.36–3.74)
WBC # BLD: 8.64 K/UL — SIGNIFICANT CHANGE UP (ref 3.8–10.5)
WBC # BLD: 8.64 K/UL — SIGNIFICANT CHANGE UP (ref 3.8–10.5)
WBC # FLD AUTO: 8.64 K/UL — SIGNIFICANT CHANGE UP (ref 3.8–10.5)
WBC # FLD AUTO: 8.64 K/UL — SIGNIFICANT CHANGE UP (ref 3.8–10.5)

## 2023-12-08 PROCEDURE — 99222 1ST HOSP IP/OBS MODERATE 55: CPT

## 2023-12-08 PROCEDURE — 71046 X-RAY EXAM CHEST 2 VIEWS: CPT | Mod: 26

## 2023-12-08 PROCEDURE — 99223 1ST HOSP IP/OBS HIGH 75: CPT | Mod: AI

## 2023-12-08 PROCEDURE — 73560 X-RAY EXAM OF KNEE 1 OR 2: CPT | Mod: 26,LT

## 2023-12-08 RX ORDER — METHADONE HYDROCHLORIDE 40 MG/1
90 TABLET ORAL
Refills: 0 | DISCHARGE

## 2023-12-08 RX ORDER — METHADONE HYDROCHLORIDE 40 MG/1
30 TABLET ORAL ONCE
Refills: 0 | Status: DISCONTINUED | OUTPATIENT
Start: 2023-12-08 | End: 2023-12-08

## 2023-12-08 RX ORDER — DILTIAZEM HCL 120 MG
10 CAPSULE, EXT RELEASE 24 HR ORAL ONCE
Refills: 0 | Status: COMPLETED | OUTPATIENT
Start: 2023-12-08 | End: 2023-12-08

## 2023-12-08 RX ORDER — METOPROLOL TARTRATE 50 MG
50 TABLET ORAL EVERY 12 HOURS
Refills: 0 | Status: DISCONTINUED | OUTPATIENT
Start: 2023-12-08 | End: 2023-12-09

## 2023-12-08 RX ORDER — ACETAMINOPHEN 500 MG
650 TABLET ORAL EVERY 6 HOURS
Refills: 0 | Status: DISCONTINUED | OUTPATIENT
Start: 2023-12-08 | End: 2023-12-11

## 2023-12-08 RX ORDER — IBUPROFEN 200 MG
400 TABLET ORAL THREE TIMES A DAY
Refills: 0 | Status: DISCONTINUED | OUTPATIENT
Start: 2023-12-08 | End: 2023-12-08

## 2023-12-08 RX ORDER — DILTIAZEM HCL 120 MG
60 CAPSULE, EXT RELEASE 24 HR ORAL ONCE
Refills: 0 | Status: COMPLETED | OUTPATIENT
Start: 2023-12-08 | End: 2023-12-08

## 2023-12-08 RX ORDER — INFLUENZA VIRUS VACCINE 15; 15; 15; 15 UG/.5ML; UG/.5ML; UG/.5ML; UG/.5ML
0.7 SUSPENSION INTRAMUSCULAR ONCE
Refills: 0 | Status: COMPLETED | OUTPATIENT
Start: 2023-12-08 | End: 2023-12-08

## 2023-12-08 RX ORDER — DILTIAZEM HCL 120 MG
10 CAPSULE, EXT RELEASE 24 HR ORAL EVERY 6 HOURS
Refills: 0 | Status: DISCONTINUED | OUTPATIENT
Start: 2023-12-08 | End: 2023-12-11

## 2023-12-08 RX ORDER — METOPROLOL TARTRATE 50 MG
25 TABLET ORAL EVERY 12 HOURS
Refills: 0 | Status: DISCONTINUED | OUTPATIENT
Start: 2023-12-08 | End: 2023-12-08

## 2023-12-08 RX ORDER — ENOXAPARIN SODIUM 100 MG/ML
80 INJECTION SUBCUTANEOUS EVERY 12 HOURS
Refills: 0 | Status: DISCONTINUED | OUTPATIENT
Start: 2023-12-08 | End: 2023-12-11

## 2023-12-08 RX ORDER — METHADONE HYDROCHLORIDE 40 MG/1
50 TABLET ORAL ONCE
Refills: 0 | Status: DISCONTINUED | OUTPATIENT
Start: 2023-12-08 | End: 2023-12-08

## 2023-12-08 RX ADMIN — Medication 50 MILLIGRAM(S): at 18:13

## 2023-12-08 RX ADMIN — Medication 650 MILLIGRAM(S): at 22:35

## 2023-12-08 RX ADMIN — METHADONE HYDROCHLORIDE 50 MILLIGRAM(S): 40 TABLET ORAL at 04:51

## 2023-12-08 RX ADMIN — ENOXAPARIN SODIUM 80 MILLIGRAM(S): 100 INJECTION SUBCUTANEOUS at 16:03

## 2023-12-08 RX ADMIN — Medication 10 MILLIGRAM(S): at 06:14

## 2023-12-08 RX ADMIN — Medication 60 MILLIGRAM(S): at 06:19

## 2023-12-08 RX ADMIN — METHADONE HYDROCHLORIDE 30 MILLIGRAM(S): 40 TABLET ORAL at 01:30

## 2023-12-08 RX ADMIN — Medication 650 MILLIGRAM(S): at 23:35

## 2023-12-08 NOTE — ED ADULT NURSE REASSESSMENT NOTE - NS ED NURSE REASSESS COMMENT FT1
vss pt medicated with second dose methadone, will continue to monitor pt resting comfortable assessment ongoing safety precautions in place

## 2023-12-08 NOTE — H&P ADULT - HISTORY OF PRESENT ILLNESS
73y Male PMH LE edema, Hepatitis C, Heroin abuse on Methadone c/o palpitations and SOB x 1-2 days.  No chest pain.  No orthopnea.  No cough / fever / chills.  Admits to increased chronic leg pain, took extra doses of Methadone (prescribed in methadone clinic weekly) and had not been on methadone x 2 days.  Found to be in Afib with RVR in ED, given Cardizem with some improvement in rate and symptoms.  No additional complaints.  Denies h/o Afib.     PMH: LE edema.   PSH:  L knee fracture, s/p ORIF.    FAMILY HISTORY: reviewed and negative.  SOCIAL HISTORY: - alcohol, - IVDA, - smoking.  REVIEW OF SYSTEMS: as above.  All remaining ROS are negative.  Allergies    No Known Allergies    Intolerances        MEDICATIONS: see medication reconciliation.

## 2023-12-08 NOTE — ED CLERICAL - DIVISION
[facility]... [facility]...

## 2023-12-08 NOTE — H&P ADULT - NSHPLABSRESULTS_GEN_ALL_CORE
VITALS:  Vital Signs Last 24 Hrs  T(C): 37 (08 Dec 2023 10:30), Max: 37 (08 Dec 2023 05:13)  T(F): 98.6 (08 Dec 2023 10:30), Max: 98.6 (08 Dec 2023 05:13)  HR: 108 (08 Dec 2023 10:30) (108 - 148)  BP: 130/88 (08 Dec 2023 10:30) (130/88 - 145/106)  BP(mean): --  RR: 18 (08 Dec 2023 10:30) (18 - 20)  SpO2: 97% (08 Dec 2023 10:30) (97% - 97%)    Parameters below as of 08 Dec 2023 10:30  Patient On (Oxygen Delivery Method): room air     Daily Height in cm: 182.88 (07 Dec 2023 20:12)    Daily   CAPILLARY BLOOD GLUCOSE        I&O's Summary      LABS:                        14.5   8.64  )-----------( 219      ( 07 Dec 2023 23:54 )             43.5     12-07    137  |  103  |  9   ----------------------------<  129<H>  4.1   |  26  |  0.75    Ca    8.9      07 Dec 2023 22:10  Phos  3.5     12-08  Mg     2.3     12-08    TPro  7.4  /  Alb  3.3  /  TBili  0.7  /  DBili  x   /  AST  31  /  ALT  30  /  AlkPhos  125<H>  12-07    PT/INR - ( 07 Dec 2023 22:10 )   PT: 12.5 sec;   INR: 1.04 ratio         PTT - ( 07 Dec 2023 22:10 )  PTT:37.5 sec  LIVER FUNCTIONS - ( 07 Dec 2023 22:10 )  Alb: 3.3 g/dL / Pro: 7.4 gm/dL / ALK PHOS: 125 U/L / ALT: 30 U/L / AST: 31 U/L / GGT: x           Urinalysis Basic - ( 07 Dec 2023 22:10 )    Color: x / Appearance: x / SG: x / pH: x  Gluc: 129 mg/dL / Ketone: x  / Bili: x / Urobili: x   Blood: x / Protein: x / Nitrite: x   Leuk Esterase: x / RBC: x / WBC x   Sq Epi: x / Non Sq Epi: x / Bacteria: x              MEDICATIONS:  enoxaparin Injectable 80 milliGRAM(s) SubCutaneous every 12 hours  ibuprofen  Tablet. 400 milliGRAM(s) Oral three times a day  metoprolol tartrate 25 milliGRAM(s) Oral every 12 hours    < from: US Duplex Venous Lower Ext Complete, Bilateral (12.07.23 @ 23:05) >    No evidence of deep venous thrombosis in either lower extremity.    < end of copied text >

## 2023-12-08 NOTE — H&P ADULT - ASSESSMENT
73y Male PMH LE edema, Hepatitis C, Heroin abuse on Methadone found to have Afib with RVR.    # Persistent Afib - pt denies previous history.  Dick negative.  No chest pain.  Continue rate control - start on Lopressor.  TFTs wnl.  CHADS-Vasc score is 1.  Started AC.  Cardiology input.  # LE edema - appears to be euvolemic.  Hold Lasix for now.  # Heroin abuse on methadone - counselled on proper use of methadone.  He was able to show me his pillbottle showing a 90mg baseline dose.  I attempted to contact clinic  closed at 10am.  Continue Methadone at prescribed dose for now.  # H/o LE fracture, chronic pain - will check L knee Xray.  # DVT Prophylaxis - OOB      73y Male PMH LE edema, Hepatitis C, Heroin abuse on Methadone found to have Afib with RVR.    # Persistent Afib - pt denies previous history.  Dick negative.  No chest pain.  Continue rate control - start on Lopressor.  TFTs wnl.  CHADS-Vasc score is 1.  Started AC.  Cardiology input.  # LE edema - appears to be euvolemic.  Hold Lasix for now.  # Heroin abuse on methadone - counselled on proper use of methadone.  He was able to show me his pillbottle showing a 90mg baseline dose.  I attempted to contact clinic  closed at 10am.  Continue Methadone at prescribed dose for now.  # H/o LE fracture, chronic pain - will check L knee Xray.  # h/o hepatitis C - check Hep C viral PCR.    # DVT Prophylaxis - OOB

## 2023-12-08 NOTE — H&P ADULT - NSHPPHYSICALEXAM_GEN_ALL_CORE
PHYSICAL EXAMINATION:  GENERAL: NAD, Alert.  HEENT:  Normocephalic and atraumatic.  Extraocular muscles are intact.  NECK: Supple.  - JVD.  CARDIOVASCULAR: irregular S1, S2.  LUNGS: CTAB, - rales, - wheezing, - rhonchi.  BACK: - CVA tenderness.  ABDOMEN: Soft, - tenderness, - distension, + BS.  EXTREMITIES: - cyanosis, - clubbing, - edema.  L knee scar, FROM.  NEUROLOGIC: strength is symmetric, sensation intact, speech fluent.  PSYCHIATRIC: Calm.  - agitation.    SKIN: - rashes, - lesions.

## 2023-12-08 NOTE — CONSULT NOTE ADULT - ASSESSMENT
74yo man with a PMH LE edema, Hepatitis C, Heroin abuse on Methadone c/o palpitations and SOB x 1-2 days.  No chest pain.  No orthopnea.  No cough / fever / chills.  Admits to increased chronic leg pain, took extra doses of Methadone (prescribed in methadone clinic weekly) and had not been on methadone x 2 days.  Found to be in Afib with RVR in ED, given Cardizem with some improvement in rate and symptoms.  No additional complaints.  Denies h/o Afib.   EKG: afib w/ RVR 143bpm  Tele afib 110-120s  TSH 0.8  BNP 5200  Trop neg x 2    -monitor on tele  -2D echo to eval LVEF  -cont metoprolol for rate control of afib  -CHADSVASC score at least 1... would start AC  -would start low dose diuresis... 20mg IV daily  -monitor creat   -replete lytes

## 2023-12-08 NOTE — CONSULT NOTE ADULT - SUBJECTIVE AND OBJECTIVE BOX
CARDIOLOGY CONSULT NOTE    Patient is a 73y Male with a known history of :    HPI:  74yo man with a PMH LE edema, Hepatitis C, Heroin abuse on Methadone c/o palpitations and SOB x 1-2 days.  No chest pain.  No orthopnea.  No cough / fever / chills.  Admits to increased chronic leg pain, took extra doses of Methadone (prescribed in methadone clinic weekly) and had not been on methadone x 2 days.  Found to be in Afib with RVR in ED, given Cardizem with some improvement in rate and symptoms.  No additional complaints.  Denies h/o Afib.   EKG: afib w/ RVR 143bpm  Tele afib 110-120s  TSH 0.8  BNP 5200  Trop neg x 2      REVIEW OF SYSTEMS:    CONSTITUTIONAL: No fever, weight loss, or fatigue  EYES: No eye pain, visual disturbances, or discharge  ENMT:  No difficulty hearing, tinnitus, vertigo; No sinus or throat pain  NECK: No pain or stiffness  BREASTS: No pain, masses, or nipple discharge  RESPIRATORY: No cough, wheezing, chills or hemoptysis; No shortness of breath  CARDIOVASCULAR: No chest pain, palpitations, dizziness, or leg swelling  GASTROINTESTINAL: No abdominal or epigastric pain. No nausea, vomiting, or hematemesis; No diarrhea or constipation. No melena or hematochezia.  GENITOURINARY: No dysuria, frequency, hematuria, or incontinence  NEUROLOGICAL: No headaches, memory loss, loss of strength, numbness, or tremors  SKIN: No itching, burning, rashes, or lesions   LYMPH NODES: No enlarged glands  ENDOCRINE: No heat or cold intolerance; No hair loss  MUSCULOSKELETAL: No joint pain or swelling; No muscle, back, or extremity pain  PSYCHIATRIC: No depression, anxiety, mood swings, or difficulty sleeping  HEME/LYMPH: No easy bruising, or bleeding gums  ALLERGY AND IMMUNOLOGIC: No hives or eczema    MEDICATIONS  (STANDING):  enoxaparin Injectable 80 milliGRAM(s) SubCutaneous every 12 hours  ibuprofen  Tablet. 400 milliGRAM(s) Oral three times a day  influenza  Vaccine (HIGH DOSE) 0.7 milliLiter(s) IntraMuscular once  metoprolol tartrate 25 milliGRAM(s) Oral every 12 hours    MEDICATIONS  (PRN):      ALLERGIES: No Known Allergies      FAMILY HISTORY:      PHYSICAL EXAMINATION:  -----------------------------  T(C): 36.7 (12-08-23 @ 11:53), Max: 37 (12-08-23 @ 05:13)  HR: 117 (12-08-23 @ 11:53) (96 - 148)  BP: 114/89 (12-08-23 @ 11:53) (114/89 - 145/106)  RR: 16 (12-08-23 @ 11:53) (16 - 20)  SpO2: 97% (12-08-23 @ 11:53) (96% - 97%)    Constitutional: well developed, normal appearance, well groomed, well nourished, no deformities and no acute distress.   Eyes: the conjunctiva exhibited no abnormalities and the eyelids demonstrated no xanthelasmas.   HEENT: normal oral mucosa, no oral pallor and no oral cyanosis.   Neck: normal jugular venous A waves present, normal jugular venous V waves present and no jugular venous evans A waves.   Pulmonary: no respiratory distress, normal respiratory rhythm and effort, no accessory muscle use and lungs were clear to auscultation bilaterally.   Cardiovascular: tachy irreg irreg  Abdomen: soft, non-tender, no hepato-splenomegaly and no abdominal mass palpated.   Musculoskeletal: the gait could not be assessed..   Extremities: no clubbing of the fingernails, no localized cyanosis, no petechial hemorrhages and no ischemic changes.   Skin: normal skin color and pigmentation, no rash, no venous stasis, no skin lesions, no skin ulcer and no xanthoma was observed.   Psychiatric: oriented to person, place, and time, the affect was normal, the mood was normal and not feeling anxious.       LABS:   --------  12-07    137  |  103  |  9   ----------------------------<  129<H>  4.1   |  26  |  0.75    Ca    8.9      07 Dec 2023 22:10  Phos  3.5     12-08  Mg     2.3     12-08    TPro  7.4  /  Alb  3.3  /  TBili  0.7  /  DBili  x   /  AST  31  /  ALT  30  /  AlkPhos  125<H>  12-07                         14.5   8.64  )-----------( 219      ( 07 Dec 2023 23:54 )             43.5     PT/INR - ( 07 Dec 2023 22:10 )   PT: 12.5 sec;   INR: 1.04 ratio         PTT - ( 07 Dec 2023 22:10 )  PTT:37.5 sec         CARDIOLOGY CONSULT NOTE    Patient is a 73y Male with a known history of :    HPI:  72yo man with a PMH LE edema, Hepatitis C, Heroin abuse on Methadone c/o palpitations and SOB x 1-2 days.  No chest pain.  No orthopnea.  No cough / fever / chills.  Admits to increased chronic leg pain, took extra doses of Methadone (prescribed in methadone clinic weekly) and had not been on methadone x 2 days.  Found to be in Afib with RVR in ED, given Cardizem with some improvement in rate and symptoms.  No additional complaints.  Denies h/o Afib.   EKG: afib w/ RVR 143bpm  Tele afib 110-120s  TSH 0.8  BNP 5200  Trop neg x 2      REVIEW OF SYSTEMS:    CONSTITUTIONAL: No fever, weight loss, or fatigue  EYES: No eye pain, visual disturbances, or discharge  ENMT:  No difficulty hearing, tinnitus, vertigo; No sinus or throat pain  NECK: No pain or stiffness  BREASTS: No pain, masses, or nipple discharge  RESPIRATORY: No cough, wheezing, chills or hemoptysis; No shortness of breath  CARDIOVASCULAR: No chest pain, palpitations, dizziness, or leg swelling  GASTROINTESTINAL: No abdominal or epigastric pain. No nausea, vomiting, or hematemesis; No diarrhea or constipation. No melena or hematochezia.  GENITOURINARY: No dysuria, frequency, hematuria, or incontinence  NEUROLOGICAL: No headaches, memory loss, loss of strength, numbness, or tremors  SKIN: No itching, burning, rashes, or lesions   LYMPH NODES: No enlarged glands  ENDOCRINE: No heat or cold intolerance; No hair loss  MUSCULOSKELETAL: No joint pain or swelling; No muscle, back, or extremity pain  PSYCHIATRIC: No depression, anxiety, mood swings, or difficulty sleeping  HEME/LYMPH: No easy bruising, or bleeding gums  ALLERGY AND IMMUNOLOGIC: No hives or eczema    MEDICATIONS  (STANDING):  enoxaparin Injectable 80 milliGRAM(s) SubCutaneous every 12 hours  ibuprofen  Tablet. 400 milliGRAM(s) Oral three times a day  influenza  Vaccine (HIGH DOSE) 0.7 milliLiter(s) IntraMuscular once  metoprolol tartrate 25 milliGRAM(s) Oral every 12 hours    MEDICATIONS  (PRN):      ALLERGIES: No Known Allergies      FAMILY HISTORY:      PHYSICAL EXAMINATION:  -----------------------------  T(C): 36.7 (12-08-23 @ 11:53), Max: 37 (12-08-23 @ 05:13)  HR: 117 (12-08-23 @ 11:53) (96 - 148)  BP: 114/89 (12-08-23 @ 11:53) (114/89 - 145/106)  RR: 16 (12-08-23 @ 11:53) (16 - 20)  SpO2: 97% (12-08-23 @ 11:53) (96% - 97%)    Constitutional: well developed, normal appearance, well groomed, well nourished, no deformities and no acute distress.   Eyes: the conjunctiva exhibited no abnormalities and the eyelids demonstrated no xanthelasmas.   HEENT: normal oral mucosa, no oral pallor and no oral cyanosis.   Neck: normal jugular venous A waves present, normal jugular venous V waves present and no jugular venous evans A waves.   Pulmonary: no respiratory distress, normal respiratory rhythm and effort, no accessory muscle use and lungs were clear to auscultation bilaterally.   Cardiovascular: tachy irreg irreg  Abdomen: soft, non-tender, no hepato-splenomegaly and no abdominal mass palpated.   Musculoskeletal: the gait could not be assessed..   Extremities: no clubbing of the fingernails, no localized cyanosis, no petechial hemorrhages and no ischemic changes.   Skin: normal skin color and pigmentation, no rash, no venous stasis, no skin lesions, no skin ulcer and no xanthoma was observed.   Psychiatric: oriented to person, place, and time, the affect was normal, the mood was normal and not feeling anxious.       LABS:   --------  12-07    137  |  103  |  9   ----------------------------<  129<H>  4.1   |  26  |  0.75    Ca    8.9      07 Dec 2023 22:10  Phos  3.5     12-08  Mg     2.3     12-08    TPro  7.4  /  Alb  3.3  /  TBili  0.7  /  DBili  x   /  AST  31  /  ALT  30  /  AlkPhos  125<H>  12-07                         14.5   8.64  )-----------( 219      ( 07 Dec 2023 23:54 )             43.5     PT/INR - ( 07 Dec 2023 22:10 )   PT: 12.5 sec;   INR: 1.04 ratio         PTT - ( 07 Dec 2023 22:10 )  PTT:37.5 sec

## 2023-12-08 NOTE — PATIENT PROFILE ADULT - FALL HARM RISK - RISK INTERVENTIONS
Assistance OOB with selected safe patient handling equipment/Assistance with ambulation/Communicate Fall Risk and Risk Factors to all staff, patient, and family/Discuss with provider need for PT consult/Monitor gait and stability/Provide patient with walking aids - walker, cane, crutches/Reinforce activity limits and safety measures with patient and family/Visual Cue: Yellow wristband/Bed in lowest position, wheels locked, appropriate side rails in place/Call bell, personal items and telephone in reach/Instruct patient to call for assistance before getting out of bed or chair/Non-slip footwear when patient is out of bed/[place] to call system/Physically safe environment - no spills, clutter or unnecessary equipment/Purposeful Proactive Rounding/Room/bathroom lighting operational, light cord in reach Assistance OOB with selected safe patient handling equipment/Assistance with ambulation/Communicate Fall Risk and Risk Factors to all staff, patient, and family/Discuss with provider need for PT consult/Monitor gait and stability/Provide patient with walking aids - walker, cane, crutches/Reinforce activity limits and safety measures with patient and family/Visual Cue: Yellow wristband/Bed in lowest position, wheels locked, appropriate side rails in place/Call bell, personal items and telephone in reach/Instruct patient to call for assistance before getting out of bed or chair/Non-slip footwear when patient is out of bed/[place] to call system/Physically safe environment - no spills, clutter or unnecessary equipment/Purposeful Proactive Rounding/Room/bathroom lighting operational, light cord in reach

## 2023-12-09 LAB
ALBUMIN SERPL ELPH-MCNC: 3.1 G/DL — LOW (ref 3.3–5)
ALBUMIN SERPL ELPH-MCNC: 3.1 G/DL — LOW (ref 3.3–5)
ALP SERPL-CCNC: 109 U/L — SIGNIFICANT CHANGE UP (ref 40–120)
ALP SERPL-CCNC: 109 U/L — SIGNIFICANT CHANGE UP (ref 40–120)
ALT FLD-CCNC: 27 U/L — SIGNIFICANT CHANGE UP (ref 12–78)
ALT FLD-CCNC: 27 U/L — SIGNIFICANT CHANGE UP (ref 12–78)
ANION GAP SERPL CALC-SCNC: 5 MMOL/L — SIGNIFICANT CHANGE UP (ref 5–17)
ANION GAP SERPL CALC-SCNC: 5 MMOL/L — SIGNIFICANT CHANGE UP (ref 5–17)
AST SERPL-CCNC: 32 U/L — SIGNIFICANT CHANGE UP (ref 15–37)
AST SERPL-CCNC: 32 U/L — SIGNIFICANT CHANGE UP (ref 15–37)
BILIRUB SERPL-MCNC: 0.8 MG/DL — SIGNIFICANT CHANGE UP (ref 0.2–1.2)
BILIRUB SERPL-MCNC: 0.8 MG/DL — SIGNIFICANT CHANGE UP (ref 0.2–1.2)
BUN SERPL-MCNC: 12 MG/DL — SIGNIFICANT CHANGE UP (ref 7–23)
BUN SERPL-MCNC: 12 MG/DL — SIGNIFICANT CHANGE UP (ref 7–23)
CALCIUM SERPL-MCNC: 9 MG/DL — SIGNIFICANT CHANGE UP (ref 8.5–10.1)
CALCIUM SERPL-MCNC: 9 MG/DL — SIGNIFICANT CHANGE UP (ref 8.5–10.1)
CHLORIDE SERPL-SCNC: 105 MMOL/L — SIGNIFICANT CHANGE UP (ref 96–108)
CHLORIDE SERPL-SCNC: 105 MMOL/L — SIGNIFICANT CHANGE UP (ref 96–108)
CO2 SERPL-SCNC: 28 MMOL/L — SIGNIFICANT CHANGE UP (ref 22–31)
CO2 SERPL-SCNC: 28 MMOL/L — SIGNIFICANT CHANGE UP (ref 22–31)
CREAT SERPL-MCNC: 0.71 MG/DL — SIGNIFICANT CHANGE UP (ref 0.5–1.3)
CREAT SERPL-MCNC: 0.71 MG/DL — SIGNIFICANT CHANGE UP (ref 0.5–1.3)
EGFR: 97 ML/MIN/1.73M2 — SIGNIFICANT CHANGE UP
EGFR: 97 ML/MIN/1.73M2 — SIGNIFICANT CHANGE UP
GLUCOSE SERPL-MCNC: 96 MG/DL — SIGNIFICANT CHANGE UP (ref 70–99)
GLUCOSE SERPL-MCNC: 96 MG/DL — SIGNIFICANT CHANGE UP (ref 70–99)
HCT VFR BLD CALC: 44.9 % — SIGNIFICANT CHANGE UP (ref 39–50)
HCT VFR BLD CALC: 44.9 % — SIGNIFICANT CHANGE UP (ref 39–50)
HCV AB S/CO SERPL IA: 12.9 S/CO — HIGH (ref 0–0.99)
HCV AB S/CO SERPL IA: 12.9 S/CO — HIGH (ref 0–0.99)
HCV AB SERPL-IMP: REACTIVE
HCV AB SERPL-IMP: REACTIVE
HGB BLD-MCNC: 14.9 G/DL — SIGNIFICANT CHANGE UP (ref 13–17)
HGB BLD-MCNC: 14.9 G/DL — SIGNIFICANT CHANGE UP (ref 13–17)
MCHC RBC-ENTMCNC: 31.6 PG — SIGNIFICANT CHANGE UP (ref 27–34)
MCHC RBC-ENTMCNC: 31.6 PG — SIGNIFICANT CHANGE UP (ref 27–34)
MCHC RBC-ENTMCNC: 33.2 G/DL — SIGNIFICANT CHANGE UP (ref 32–36)
MCHC RBC-ENTMCNC: 33.2 G/DL — SIGNIFICANT CHANGE UP (ref 32–36)
MCV RBC AUTO: 95.3 FL — SIGNIFICANT CHANGE UP (ref 80–100)
MCV RBC AUTO: 95.3 FL — SIGNIFICANT CHANGE UP (ref 80–100)
NRBC # BLD: 0 /100 WBCS — SIGNIFICANT CHANGE UP (ref 0–0)
NRBC # BLD: 0 /100 WBCS — SIGNIFICANT CHANGE UP (ref 0–0)
PLATELET # BLD AUTO: 212 K/UL — SIGNIFICANT CHANGE UP (ref 150–400)
PLATELET # BLD AUTO: 212 K/UL — SIGNIFICANT CHANGE UP (ref 150–400)
POTASSIUM SERPL-MCNC: 4 MMOL/L — SIGNIFICANT CHANGE UP (ref 3.5–5.3)
POTASSIUM SERPL-MCNC: 4 MMOL/L — SIGNIFICANT CHANGE UP (ref 3.5–5.3)
POTASSIUM SERPL-SCNC: 4 MMOL/L — SIGNIFICANT CHANGE UP (ref 3.5–5.3)
POTASSIUM SERPL-SCNC: 4 MMOL/L — SIGNIFICANT CHANGE UP (ref 3.5–5.3)
PROT SERPL-MCNC: 6.9 GM/DL — SIGNIFICANT CHANGE UP (ref 6–8.3)
PROT SERPL-MCNC: 6.9 GM/DL — SIGNIFICANT CHANGE UP (ref 6–8.3)
RBC # BLD: 4.71 M/UL — SIGNIFICANT CHANGE UP (ref 4.2–5.8)
RBC # BLD: 4.71 M/UL — SIGNIFICANT CHANGE UP (ref 4.2–5.8)
RBC # FLD: 13.4 % — SIGNIFICANT CHANGE UP (ref 10.3–14.5)
RBC # FLD: 13.4 % — SIGNIFICANT CHANGE UP (ref 10.3–14.5)
SODIUM SERPL-SCNC: 138 MMOL/L — SIGNIFICANT CHANGE UP (ref 135–145)
SODIUM SERPL-SCNC: 138 MMOL/L — SIGNIFICANT CHANGE UP (ref 135–145)
WBC # BLD: 8.41 K/UL — SIGNIFICANT CHANGE UP (ref 3.8–10.5)
WBC # BLD: 8.41 K/UL — SIGNIFICANT CHANGE UP (ref 3.8–10.5)
WBC # FLD AUTO: 8.41 K/UL — SIGNIFICANT CHANGE UP (ref 3.8–10.5)
WBC # FLD AUTO: 8.41 K/UL — SIGNIFICANT CHANGE UP (ref 3.8–10.5)

## 2023-12-09 PROCEDURE — 93306 TTE W/DOPPLER COMPLETE: CPT | Mod: 26

## 2023-12-09 PROCEDURE — 93010 ELECTROCARDIOGRAM REPORT: CPT

## 2023-12-09 PROCEDURE — 99232 SBSQ HOSP IP/OBS MODERATE 35: CPT

## 2023-12-09 RX ORDER — FUROSEMIDE 40 MG
20 TABLET ORAL DAILY
Refills: 0 | Status: DISCONTINUED | OUTPATIENT
Start: 2023-12-09 | End: 2023-12-09

## 2023-12-09 RX ORDER — METHADONE HYDROCHLORIDE 40 MG/1
40 TABLET ORAL ONCE
Refills: 0 | Status: DISCONTINUED | OUTPATIENT
Start: 2023-12-09 | End: 2023-12-09

## 2023-12-09 RX ORDER — METOPROLOL TARTRATE 50 MG
50 TABLET ORAL EVERY 8 HOURS
Refills: 0 | Status: DISCONTINUED | OUTPATIENT
Start: 2023-12-09 | End: 2023-12-11

## 2023-12-09 RX ORDER — FUROSEMIDE 40 MG
20 TABLET ORAL ONCE
Refills: 0 | Status: COMPLETED | OUTPATIENT
Start: 2023-12-09 | End: 2023-12-10

## 2023-12-09 RX ADMIN — Medication 50 MILLIGRAM(S): at 05:16

## 2023-12-09 RX ADMIN — ENOXAPARIN SODIUM 80 MILLIGRAM(S): 100 INJECTION SUBCUTANEOUS at 04:51

## 2023-12-09 RX ADMIN — ENOXAPARIN SODIUM 80 MILLIGRAM(S): 100 INJECTION SUBCUTANEOUS at 17:25

## 2023-12-09 RX ADMIN — Medication 50 MILLIGRAM(S): at 14:58

## 2023-12-09 RX ADMIN — METHADONE HYDROCHLORIDE 40 MILLIGRAM(S): 40 TABLET ORAL at 01:00

## 2023-12-09 RX ADMIN — Medication 50 MILLIGRAM(S): at 22:16

## 2023-12-09 RX ADMIN — METHADONE HYDROCHLORIDE 40 MILLIGRAM(S): 40 TABLET ORAL at 03:30

## 2023-12-09 NOTE — PROGRESS NOTE ADULT - SUBJECTIVE AND OBJECTIVE BOX
Patient: JORGE ZAPATA [id number] 73y Male                            Hospitalist Attending Note    c/o persistent generalized pain, requesting methadone.  No chest pain / palpitations.   No additional complaints.     ____________________PHYSICAL EXAM:  GENERAL:  NAD Alert and Oriented x 3   HEENT: NCAT  CARDIOVASCULAR:  S1, S2 irregular.   LUNGS: CTAB  ABDOMEN:  soft, (-) tenderness, (-) distension, (+) bowel sounds, (-) guarding, (-) rebound (-) rigidity  EXTREMITIES:  no cyanosis / clubbing / edema.   ____________________     VITALS:  Vital Signs Last 24 Hrs  T(C): 36.7 (09 Dec 2023 11:04), Max: 36.9 (08 Dec 2023 23:41)  T(F): 98 (09 Dec 2023 11:04), Max: 98.5 (08 Dec 2023 23:41)  HR: 93 (09 Dec 2023 11:04) (93 - 132)  BP: 105/78 (09 Dec 2023 11:04) (105/78 - 159/115)  BP(mean): --  RR: 18 (09 Dec 2023 11:04) (18 - 18)  SpO2: 96% (09 Dec 2023 11:04) (93% - 96%)    Parameters below as of 08 Dec 2023 23:41  Patient On (Oxygen Delivery Method): room air     Daily Height in cm: 182.88 (08 Dec 2023 16:43)    Daily Weight in k.3 (09 Dec 2023 04:48)  CAPILLARY BLOOD GLUCOSE        I&O's Summary    08 Dec 2023 07:01  -  09 Dec 2023 07:00  --------------------------------------------------------  IN: 240 mL / OUT: 400 mL / NET: -160 mL        HISTORY:  PAST MEDICAL & SURGICAL HISTORY:  Hepatitis C      Hip fracture  Left hip fracture s/p ORIF      Allergies    No Known Allergies    Intolerances       LABS:                        14.9   8.41  )-----------( 212      ( 09 Dec 2023 07:50 )             44.9         138  |  105  |  12  ----------------------------<  96  4.0   |  28  |  0.71    Ca    9.0      09 Dec 2023 07:50  Phos  3.5     12  Mg     2.3     12-    TPro  6.9  /  Alb  3.1<L>  /  TBili  0.8  /  DBili  x   /  AST  32  /  ALT  27  /  AlkPhos  109  12-    PT/INR - ( 07 Dec 2023 22:10 )   PT: 12.5 sec;   INR: 1.04 ratio         PTT - ( 07 Dec 2023 22:10 )  PTT:37.5 sec  LIVER FUNCTIONS - ( 09 Dec 2023 07:50 )  Alb: 3.1 g/dL / Pro: 6.9 gm/dL / ALK PHOS: 109 U/L / ALT: 27 U/L / AST: 32 U/L / GGT: x           Urinalysis Basic - ( 09 Dec 2023 07:50 )    Color: x / Appearance: x / SG: x / pH: x  Gluc: 96 mg/dL / Ketone: x  / Bili: x / Urobili: x   Blood: x / Protein: x / Nitrite: x   Leuk Esterase: x / RBC: x / WBC x   Sq Epi: x / Non Sq Epi: x / Bacteria: x              MEDICATIONS:  MEDICATIONS  (STANDING):  enoxaparin Injectable 80 milliGRAM(s) SubCutaneous every 12 hours  furosemide    Tablet 20 milliGRAM(s) Oral daily  influenza  Vaccine (HIGH DOSE) 0.7 milliLiter(s) IntraMuscular once  metoprolol tartrate 50 milliGRAM(s) Oral every 8 hours    MEDICATIONS  (PRN):  acetaminophen     Tablet .. 650 milliGRAM(s) Oral every 6 hours PRN Moderate Pain (4 - 6), Severe Pain (7 - 10)  diltiazem Injectable 10 milliGRAM(s) IV Push every 6 hours PRN HR > 150 sustained.                             Patient: JORGE ZAPATA [id number] 73y Male                            Hospitalist Attending Note    c/o persistent generalized pain, requesting methadone.  No chest pain / palpitations.   No additional complaints.     ____________________PHYSICAL EXAM:  GENERAL:  NAD Alert and Oriented x 3   HEENT: NCAT  CARDIOVASCULAR:  S1, S2 irregular.   LUNGS: CTAB  ABDOMEN:  soft, (-) tenderness, (-) distension, (+) bowel sounds, (-) guarding, (-) rebound (-) rigidity  EXTREMITIES:  no cyanosis / clubbing / edema.   ____________________     VITALS:  Vital Signs Last 24 Hrs  T(C): 36.7 (09 Dec 2023 11:04), Max: 36.9 (08 Dec 2023 23:41)  T(F): 98 (09 Dec 2023 11:04), Max: 98.5 (08 Dec 2023 23:41)  HR: 93 (09 Dec 2023 11:04) (93 - 132)  BP: 105/78 (09 Dec 2023 11:04) (105/78 - 159/115)  BP(mean): --  RR: 18 (09 Dec 2023 11:04) (18 - 18)  SpO2: 96% (09 Dec 2023 11:04) (93% - 96%)    Parameters below as of 08 Dec 2023 23:41  Patient On (Oxygen Delivery Method): room air     Daily Height in cm: 182.88 (08 Dec 2023 16:43)    Daily Weight in k.3 (09 Dec 2023 04:48)  CAPILLARY BLOOD GLUCOSE        I&O's Summary    08 Dec 2023 07:01  -  09 Dec 2023 07:00  --------------------------------------------------------  IN: 240 mL / OUT: 400 mL / NET: -160 mL        HISTORY:  PAST MEDICAL & SURGICAL HISTORY:  Hepatitis C      Hip fracture  Left hip fracture s/p ORIF      Allergies    No Known Allergies    Intolerances       LABS:                        14.9   8.41  )-----------( 212      ( 09 Dec 2023 07:50 )             44.9         138  |  105  |  12  ----------------------------<  96  4.0   |  28  |  0.71    Ca    9.0      09 Dec 2023 07:50  Phos  3.5     12  Mg     2.3     12-    TPro  6.9  /  Alb  3.1<L>  /  TBili  0.8  /  DBili  x   /  AST  32  /  ALT  27  /  AlkPhos  109  12-    PT/INR - ( 07 Dec 2023 22:10 )   PT: 12.5 sec;   INR: 1.04 ratio         PTT - ( 07 Dec 2023 22:10 )  PTT:37.5 sec  LIVER FUNCTIONS - ( 09 Dec 2023 07:50 )  Alb: 3.1 g/dL / Pro: 6.9 gm/dL / ALK PHOS: 109 U/L / ALT: 27 U/L / AST: 32 U/L / GGT: x           Urinalysis Basic - ( 09 Dec 2023 07:50 )    Color: x / Appearance: x / SG: x / pH: x  Gluc: 96 mg/dL / Ketone: x  / Bili: x / Urobili: x   Blood: x / Protein: x / Nitrite: x   Leuk Esterase: x / RBC: x / WBC x   Sq Epi: x / Non Sq Epi: x / Bacteria: x              MEDICATIONS:  MEDICATIONS  (STANDING):  enoxaparin Injectable 80 milliGRAM(s) SubCutaneous every 12 hours  furosemide    Tablet 20 milliGRAM(s) Oral daily  influenza  Vaccine (HIGH DOSE) 0.7 milliLiter(s) IntraMuscular once  metoprolol tartrate 50 milliGRAM(s) Oral every 8 hours    MEDICATIONS  (PRN):  acetaminophen     Tablet .. 650 milliGRAM(s) Oral every 6 hours PRN Moderate Pain (4 - 6), Severe Pain (7 - 10)  diltiazem Injectable 10 milliGRAM(s) IV Push every 6 hours PRN HR > 150 sustained.

## 2023-12-09 NOTE — CHART NOTE - NSCHARTNOTEFT_GEN_A_CORE
S) 74yo man with a PMH LE edema, Hepatitis C, Heroin abuse on Methadone c/o palpitations and SOB x 1-2 days.  No chest pain.  No orthopnea.  No cough / fever / chills.  Admits to increased chronic leg pain, took extra doses of Methadone (prescribed in methadone clinic weekly) and had not been on methadone x 2 days.  Found to be in Afib with RVR in ED, given Cardizem with some improvement in rate and symptoms.  No additional complaints.  Denies h/o Afib.   Patient in a Methadone Program and hasn't been Verified as of yet.  Received 80mg of Methadone yesterday.  Usual dose 90mg.  Receives weekly supplies from the program.   [facility] Division of Chemical Dependance  Dr. Noble, NP [address]  Phone [phone]  Called to see patient due to feeling anxious and having body pains.  This is typical when he doesn't take full dose.  Meds  acetaminophen     Tablet .. 650 milliGRAM(s) Oral every 6 hours PRN  diltiazem Injectable 10 milliGRAM(s) IV Push every 6 hours PRN  enoxaparin Injectable 80 milliGRAM(s) SubCutaneous every 12 hours  influenza  Vaccine (HIGH DOSE) 0.7 milliLiter(s) IntraMuscular once  metoprolol tartrate 50 milliGRAM(s) Oral every 12 hours    O)   T(C): 36.9 (12-08-23 @ 23:41), Max: 37 (12-08-23 @ 05:13)  HR: 114 (12-08-23 @ 23:41) (96 - 128)  BP: 146/87 (12-08-23 @ 23:41) (114/89 - 147/98)  RR: 18 (12-08-23 @ 23:41) (16 - 19)  SpO2: 93% (12-08-23 @ 23:41) (93% - 97%)  PHYSICAL EXAM:  GENERAL: well devolped male, sitting on edge of bed  HEART: rate 110-120 and rhythm a. fib; No murmurs, rubs, or gallops  RESPIRATORY: CTA B/L, No W/R/R  ABDOMEN: Soft, Nontender, Nondistended; Bowel sounds present    12-07    137  |  103  |  9   ----------------------------<  129<H>  4.1   |  26  |  0.75    Ca    8.9      07 Dec 2023 22:10  Phos  3.5     12-08  Mg     2.3     12-08    TPro  7.4  /  Alb  3.3  /  TBili  0.7  /  DBili  x   /  AST  31  /  ALT  30  /  AlkPhos  125<H>  12-07                          14.5   8.64  )-----------( 219      ( 07 Dec 2023 23:54 )             43.5     LIVER FUNCTIONS - ( 07 Dec 2023 22:10 )  Alb: 3.3 g/dL / Pro: 7.4 gm/dL / ALK PHOS: 125 U/L / ALT: 30 U/L / AST: 31 U/L / GGT: x  A/P  74yo man with a PMH LE edema, Hepatitis C, Heroin abuse on Methadone c/o palpitations and SOB x 1-2 days.  No chest pain.  No orthopnea.  No cough / fever / chills.  Admits to increased chronic leg pain, took extra doses of Methadone (prescribed in methadone clinic weekly) and had not been on methadone x 2 days.  Found to be in Afib with RVR in ED, given Cardizem with some improvement in rate and symptoms.  No additional complaints.  Denies h/o Afib.    Heroin Dependance on chronic Methadone (program not verified)  [ ] Methadone  40 mg now  [ ] Call in am to try to verify dose    [facility] Division of Chemical Dependance  Dr. Noble, NP [address]  Phone [phone] S) 72yo man with a PMH LE edema, Hepatitis C, Heroin abuse on Methadone c/o palpitations and SOB x 1-2 days.  No chest pain.  No orthopnea.  No cough / fever / chills.  Admits to increased chronic leg pain, took extra doses of Methadone (prescribed in methadone clinic weekly) and had not been on methadone x 2 days.  Found to be in Afib with RVR in ED, given Cardizem with some improvement in rate and symptoms.  No additional complaints.  Denies h/o Afib.   Patient in a Methadone Program and hasn't been Verified as of yet.  Received 80mg of Methadone yesterday.  Usual dose 90mg.  Receives weekly supplies from the program.   [facility] Division of Chemical Dependance  Dr. Noble, NP [address]  Phone [phone]  Called to see patient due to feeling anxious and having body pains.  This is typical when he doesn't take full dose.  Meds  acetaminophen     Tablet .. 650 milliGRAM(s) Oral every 6 hours PRN  diltiazem Injectable 10 milliGRAM(s) IV Push every 6 hours PRN  enoxaparin Injectable 80 milliGRAM(s) SubCutaneous every 12 hours  influenza  Vaccine (HIGH DOSE) 0.7 milliLiter(s) IntraMuscular once  metoprolol tartrate 50 milliGRAM(s) Oral every 12 hours    O)   T(C): 36.9 (12-08-23 @ 23:41), Max: 37 (12-08-23 @ 05:13)  HR: 114 (12-08-23 @ 23:41) (96 - 128)  BP: 146/87 (12-08-23 @ 23:41) (114/89 - 147/98)  RR: 18 (12-08-23 @ 23:41) (16 - 19)  SpO2: 93% (12-08-23 @ 23:41) (93% - 97%)  PHYSICAL EXAM:  GENERAL: well devolped male, sitting on edge of bed  HEART: rate 110-120 and rhythm a. fib; No murmurs, rubs, or gallops  RESPIRATORY: CTA B/L, No W/R/R  ABDOMEN: Soft, Nontender, Nondistended; Bowel sounds present    12-07    137  |  103  |  9   ----------------------------<  129<H>  4.1   |  26  |  0.75    Ca    8.9      07 Dec 2023 22:10  Phos  3.5     12-08  Mg     2.3     12-08    TPro  7.4  /  Alb  3.3  /  TBili  0.7  /  DBili  x   /  AST  31  /  ALT  30  /  AlkPhos  125<H>  12-07                          14.5   8.64  )-----------( 219      ( 07 Dec 2023 23:54 )             43.5     LIVER FUNCTIONS - ( 07 Dec 2023 22:10 )  Alb: 3.3 g/dL / Pro: 7.4 gm/dL / ALK PHOS: 125 U/L / ALT: 30 U/L / AST: 31 U/L / GGT: x  A/P  72yo man with a PMH LE edema, Hepatitis C, Heroin abuse on Methadone c/o palpitations and SOB x 1-2 days.  No chest pain.  No orthopnea.  No cough / fever / chills.  Admits to increased chronic leg pain, took extra doses of Methadone (prescribed in methadone clinic weekly) and had not been on methadone x 2 days.  Found to be in Afib with RVR in ED, given Cardizem with some improvement in rate and symptoms.  No additional complaints.  Denies h/o Afib.    Heroin Dependance on chronic Methadone (program not verified)  [ ] Methadone  40 mg now  [ ] Call in am to try to verify dose    [facility] Division of Chemical Dependance  Dr. Noble, NP [address]  Phone [phone] S) 72yo man with a PMH LE edema, Hepatitis C, Heroin abuse on Methadone c/o palpitations and SOB x 1-2 days.  No chest pain.  No orthopnea.  No cough / fever / chills.  Admits to increased chronic leg pain, took extra doses of Methadone (prescribed in methadone clinic weekly) and had not been on methadone x 2 days.  Found to be in Afib with RVR in ED, given Cardizem with some improvement in rate and symptoms.  No additional complaints.  Denies h/o Afib.   Patient in a Methadone Program and hasn't been Verified as of yet.  Received 80mg of Methadone yesterday.  Usual dose 90mg.  Receives weekly supplies from the program.   [facility] Division of Chemical Dependance  Dr. Noble, NP [address]  Phone [phone]  Called to see patient due to feeling anxious and having body pains.  This is typical when he doesn't take full dose.  Meds  acetaminophen     Tablet .. 650 milliGRAM(s) Oral every 6 hours PRN  diltiazem Injectable 10 milliGRAM(s) IV Push every 6 hours PRN  enoxaparin Injectable 80 milliGRAM(s) SubCutaneous every 12 hours  influenza  Vaccine (HIGH DOSE) 0.7 milliLiter(s) IntraMuscular once  metoprolol tartrate 50 milliGRAM(s) Oral every 12 hours    O)   T(C): 36.9 (12-08-23 @ 23:41), Max: 37 (12-08-23 @ 05:13)  HR: 114 (12-08-23 @ 23:41) (96 - 128)  BP: 146/87 (12-08-23 @ 23:41) (114/89 - 147/98)  RR: 18 (12-08-23 @ 23:41) (16 - 19)  SpO2: 93% (12-08-23 @ 23:41) (93% - 97%)  PHYSICAL EXAM:  GENERAL: well devolped male, sitting on edge of bed  HEART: rate 110-120 and rhythm a. fib; No murmurs, rubs, or gallops  RESPIRATORY: CTA B/L, No W/R/R  ABDOMEN: Soft, Nontender, Nondistended; Bowel sounds present    12-07    137  |  103  |  9   ----------------------------<  129<H>  4.1   |  26  |  0.75    Ca    8.9      07 Dec 2023 22:10  Phos  3.5     12-08  Mg     2.3     12-08    TPro  7.4  /  Alb  3.3  /  TBili  0.7  /  DBili  x   /  AST  31  /  ALT  30  /  AlkPhos  125<H>  12-07                          14.5   8.64  )-----------( 219      ( 07 Dec 2023 23:54 )             43.5     LIVER FUNCTIONS - ( 07 Dec 2023 22:10 )  Alb: 3.3 g/dL / Pro: 7.4 gm/dL / ALK PHOS: 125 U/L / ALT: 30 U/L / AST: 31 U/L / GGT: x  A/P  72yo man with a PMH LE edema, Hepatitis C, Heroin abuse on Methadone c/o palpitations and SOB x 1-2 days.  No chest pain.  No orthopnea.  No cough / fever / chills.  Admits to increased chronic leg pain, took extra doses of Methadone (prescribed in methadone clinic weekly) and had not been on methadone x 2 days.  Found to be in Afib with RVR in ED, given Cardizem with some improvement in rate and symptoms.  No additional complaints.  Denies h/o Afib.    Heroin Dependance on chronic Methadone (program not verified)  [ ] Methadone  40 mg now  [ ] Call in am to try to verify dose    [facility] Division of Chemical Dependance  Dr. Noble, NP [facility]  [address], [place]  Phone [phone]      Addendum   12/9/23  2:35 am    Patient still very anxious and muscle pains.  States he needs the rest of the dose of Methadone.  If he doesn't get it, he wants to sign out AMA.  I called to pharmacy and explained the situation.  Since we have the empty bottles with the dose on it, they are willing to release another 40mg since he still is having withdrawal symptoms at 3 am.   Will place a timed order for 3 am. S) 74yo man with a PMH LE edema, Hepatitis C, Heroin abuse on Methadone c/o palpitations and SOB x 1-2 days.  No chest pain.  No orthopnea.  No cough / fever / chills.  Admits to increased chronic leg pain, took extra doses of Methadone (prescribed in methadone clinic weekly) and had not been on methadone x 2 days.  Found to be in Afib with RVR in ED, given Cardizem with some improvement in rate and symptoms.  No additional complaints.  Denies h/o Afib.   Patient in a Methadone Program and hasn't been Verified as of yet.  Received 80mg of Methadone yesterday.  Usual dose 90mg.  Receives weekly supplies from the program.   [facility] Division of Chemical Dependance  Dr. Noble, NP [address]  Phone [phone]  Called to see patient due to feeling anxious and having body pains.  This is typical when he doesn't take full dose.  Meds  acetaminophen     Tablet .. 650 milliGRAM(s) Oral every 6 hours PRN  diltiazem Injectable 10 milliGRAM(s) IV Push every 6 hours PRN  enoxaparin Injectable 80 milliGRAM(s) SubCutaneous every 12 hours  influenza  Vaccine (HIGH DOSE) 0.7 milliLiter(s) IntraMuscular once  metoprolol tartrate 50 milliGRAM(s) Oral every 12 hours    O)   T(C): 36.9 (12-08-23 @ 23:41), Max: 37 (12-08-23 @ 05:13)  HR: 114 (12-08-23 @ 23:41) (96 - 128)  BP: 146/87 (12-08-23 @ 23:41) (114/89 - 147/98)  RR: 18 (12-08-23 @ 23:41) (16 - 19)  SpO2: 93% (12-08-23 @ 23:41) (93% - 97%)  PHYSICAL EXAM:  GENERAL: well devolped male, sitting on edge of bed  HEART: rate 110-120 and rhythm a. fib; No murmurs, rubs, or gallops  RESPIRATORY: CTA B/L, No W/R/R  ABDOMEN: Soft, Nontender, Nondistended; Bowel sounds present    12-07    137  |  103  |  9   ----------------------------<  129<H>  4.1   |  26  |  0.75    Ca    8.9      07 Dec 2023 22:10  Phos  3.5     12-08  Mg     2.3     12-08    TPro  7.4  /  Alb  3.3  /  TBili  0.7  /  DBili  x   /  AST  31  /  ALT  30  /  AlkPhos  125<H>  12-07                          14.5   8.64  )-----------( 219      ( 07 Dec 2023 23:54 )             43.5     LIVER FUNCTIONS - ( 07 Dec 2023 22:10 )  Alb: 3.3 g/dL / Pro: 7.4 gm/dL / ALK PHOS: 125 U/L / ALT: 30 U/L / AST: 31 U/L / GGT: x  A/P  74yo man with a PMH LE edema, Hepatitis C, Heroin abuse on Methadone c/o palpitations and SOB x 1-2 days.  No chest pain.  No orthopnea.  No cough / fever / chills.  Admits to increased chronic leg pain, took extra doses of Methadone (prescribed in methadone clinic weekly) and had not been on methadone x 2 days.  Found to be in Afib with RVR in ED, given Cardizem with some improvement in rate and symptoms.  No additional complaints.  Denies h/o Afib.    Heroin Dependance on chronic Methadone (program not verified)  [ ] Methadone  40 mg now  [ ] Call in am to try to verify dose    [facility] Division of Chemical Dependance  Dr. Noble, NP [facility]  [address] [place], [place]  Phone [phone]      Addendum   12/9/23  2:35 am    Patient still very anxious and muscle pains.  States he needs the rest of the dose of Methadone.  If he doesn't get it, he wants to sign out AMA.  I called to pharmacy and explained the situation.  Since we have the empty bottles with the dose on it, they are willing to release another 40mg since he still is having withdrawal symptoms at 3 am.   Will place a timed order for 3 am.

## 2023-12-10 PROCEDURE — 99232 SBSQ HOSP IP/OBS MODERATE 35: CPT

## 2023-12-10 RX ORDER — METHADONE HYDROCHLORIDE 40 MG/1
10 TABLET ORAL ONCE
Refills: 0 | Status: DISCONTINUED | OUTPATIENT
Start: 2023-12-10 | End: 2023-12-10

## 2023-12-10 RX ORDER — METHADONE HYDROCHLORIDE 40 MG/1
40 TABLET ORAL ONCE
Refills: 0 | Status: DISCONTINUED | OUTPATIENT
Start: 2023-12-10 | End: 2023-12-10

## 2023-12-10 RX ORDER — SACUBITRIL AND VALSARTAN 24; 26 MG/1; MG/1
1 TABLET, FILM COATED ORAL
Refills: 0 | Status: DISCONTINUED | OUTPATIENT
Start: 2023-12-10 | End: 2023-12-11

## 2023-12-10 RX ORDER — METHADONE HYDROCHLORIDE 40 MG/1
40 TABLET ORAL DAILY
Refills: 0 | Status: DISCONTINUED | OUTPATIENT
Start: 2023-12-10 | End: 2023-12-11

## 2023-12-10 RX ADMIN — Medication 20 MILLIGRAM(S): at 11:31

## 2023-12-10 RX ADMIN — ENOXAPARIN SODIUM 80 MILLIGRAM(S): 100 INJECTION SUBCUTANEOUS at 17:42

## 2023-12-10 RX ADMIN — Medication 50 MILLIGRAM(S): at 05:56

## 2023-12-10 RX ADMIN — SACUBITRIL AND VALSARTAN 1 TABLET(S): 24; 26 TABLET, FILM COATED ORAL at 17:42

## 2023-12-10 RX ADMIN — Medication 50 MILLIGRAM(S): at 22:18

## 2023-12-10 RX ADMIN — ENOXAPARIN SODIUM 80 MILLIGRAM(S): 100 INJECTION SUBCUTANEOUS at 05:56

## 2023-12-10 RX ADMIN — METHADONE HYDROCHLORIDE 40 MILLIGRAM(S): 40 TABLET ORAL at 09:54

## 2023-12-10 RX ADMIN — Medication 50 MILLIGRAM(S): at 15:37

## 2023-12-10 RX ADMIN — METHADONE HYDROCHLORIDE 10 MILLIGRAM(S): 40 TABLET ORAL at 23:14

## 2023-12-10 NOTE — DIETITIAN NUTRITION RISK NOTIFICATION - ADDITIONAL COMMENTS/DIETITIAN RECOMMENDATIONS
Add [employer] Standard 1.0 nutritional supplement once per day which provides 325 calories, 16 grams protein Add [employer] Standard 1.0 nutritional supplement once per day which provides 325 calories, 16 grams protein

## 2023-12-10 NOTE — DIETITIAN INITIAL EVALUATION ADULT - ORAL INTAKE PTA/DIET HISTORY
Pt reports poor appetite and PO intake PTA x 6 months due to broken legs and not being able to cook for himself. States he is now able to cook for self at home. Reports he follows vegetarian diet at home but has no other diet restrictions.

## 2023-12-10 NOTE — DIETITIAN INITIAL EVALUATION ADULT - OTHER INFO
Denies difficulty chewing/swallowing. Reports weight loss x 6 months;  pounds. See weight loss details below.   Pt amenable to [employer] Standard 1.0 nutritional supplement once per day which provides 325 calories, 16 grams protein. Discussed how to optimize PO intake with use of nutritional supplement. Preferences taken and relayed to diet office. Pt made aware RD remains available.  Denies difficulty chewing/swallowing. Reports weight loss x 6 months;  pounds. See weight loss details below.   Pt amenable to [employer] Standard 1.0 nutritional supplement once per day which provides 325 calories, 16 grams protein. Discussed how to optimize PO intake with use of nutritional supplement. Preferences taken and relayed to diet office. Pt made aware RD remains available.

## 2023-12-10 NOTE — DIETITIAN INITIAL EVALUATION ADULT - PERTINENT MEDS FT
MEDICATIONS  (STANDING):  enoxaparin Injectable 80 milliGRAM(s) SubCutaneous every 12 hours  influenza  Vaccine (HIGH DOSE) 0.7 milliLiter(s) IntraMuscular once  metoprolol tartrate 50 milliGRAM(s) Oral every 8 hours  sacubitril 24 mG/valsartan 26 mG 1 Tablet(s) Oral two times a day    MEDICATIONS  (PRN):  acetaminophen     Tablet .. 650 milliGRAM(s) Oral every 6 hours PRN Moderate Pain (4 - 6), Severe Pain (7 - 10)  diltiazem Injectable 10 milliGRAM(s) IV Push every 6 hours PRN HR > 150 sustained.

## 2023-12-10 NOTE — PROGRESS NOTE ADULT - SUBJECTIVE AND OBJECTIVE BOX
CARDIOLOGY CONSULT NOTE    Patient is a 73y Male with a known history of :    HPI:  72yo man with a PMH LE edema, Hepatitis C, Heroin abuse on Methadone c/o palpitations and SOB x 1-2 days.  No chest pain.  No orthopnea.  No cough / fever / chills.  Admits to increased chronic leg pain, took extra doses of Methadone (prescribed in methadone clinic weekly) and had not been on methadone x 2 days.  Found to be in Afib with RVR in ED, given Cardizem with some improvement in rate and symptoms.  No additional complaints.  Denies h/o Afib.   EKG: afib w/ RVR 143bpm  Tele afib 110-120s  TSH 0.8  BNP 5200  Trop neg x 2    12/10/23:  Tele: HRs 80-90s  Echo: LVEF 30-35%, LVH, mod MR, mild AS    REVIEW OF SYSTEMS:  CONSTITUTIONAL: No fever, weight loss, or fatigue  EYES: No eye pain, visual disturbances, or discharge  ENMT:  No difficulty hearing, tinnitus, vertigo; No sinus or throat pain  NECK: No pain or stiffness  BREASTS: No pain, masses, or nipple discharge  RESPIRATORY: No cough, wheezing, chills or hemoptysis; No shortness of breath  CARDIOVASCULAR: No chest pain, palpitations, dizziness, or leg swelling  GASTROINTESTINAL: No abdominal or epigastric pain. No nausea, vomiting, or hematemesis; No diarrhea or constipation. No melena or hematochezia.  GENITOURINARY: No dysuria, frequency, hematuria, or incontinence  NEUROLOGICAL: No headaches, memory loss, loss of strength, numbness, or tremors  SKIN: No itching, burning, rashes, or lesions   LYMPH NODES: No enlarged glands  ENDOCRINE: No heat or cold intolerance; No hair loss  MUSCULOSKELETAL: No joint pain or swelling; No muscle, back, or extremity pain  PSYCHIATRIC: No depression, anxiety, mood swings, or difficulty sleeping  HEME/LYMPH: No easy bruising, or bleeding gums  ALLERGY AND IMMUNOLOGIC: No hives or eczema    MEDICATIONS  (STANDING):  enoxaparin Injectable 80 milliGRAM(s) SubCutaneous every 12 hours  ibuprofen  Tablet. 400 milliGRAM(s) Oral three times a day  influenza  Vaccine (HIGH DOSE) 0.7 milliLiter(s) IntraMuscular once  metoprolol tartrate 25 milliGRAM(s) Oral every 12 hours    MEDICATIONS  (PRN):      ALLERGIES: No Known Allergies      FAMILY HISTORY:      PHYSICAL EXAMINATION:  -----------------------------  Vital Signs Last 24 Hrs  T(C): 36.4 (10 Dec 2023 04:40), Max: 36.7 (09 Dec 2023 11:04)  T(F): 97.6 (10 Dec 2023 04:40), Max: 98 (09 Dec 2023 11:04)  HR: 96 (10 Dec 2023 04:40) (84 - 96)  BP: 138/93 (10 Dec 2023 04:40) (105/78 - 138/93)  RR: 18 (10 Dec 2023 04:40) (18 - 18)  SpO2: 97% (10 Dec 2023 04:40) (95% - 100%)      Constitutional: well developed, normal appearance, well groomed, well nourished, no deformities and no acute distress.   Eyes: the conjunctiva exhibited no abnormalities and the eyelids demonstrated no xanthelasmas.   HEENT: normal oral mucosa, no oral pallor and no oral cyanosis.   Neck: normal jugular venous A waves present, normal jugular venous V waves present and no jugular venous evans A waves.   Pulmonary: no respiratory distress, normal respiratory rhythm and effort, no accessory muscle use and lungs were clear to auscultation bilaterally.   Cardiovascular: tachy irreg irreg  Abdomen: soft, non-tender, no hepato-splenomegaly and no abdominal mass palpated.   Musculoskeletal: the gait could not be assessed..   Extremities: no clubbing of the fingernails, no localized cyanosis, no petechial hemorrhages and no ischemic changes.   Skin: normal skin color and pigmentation, no rash, no venous stasis, no skin lesions, no skin ulcer and no xanthoma was observed.   Psychiatric: oriented to person, place, and time, the affect was normal, the mood was normal and not feeling anxious.       LABS:   --------                        14.9   8.41  )-----------( 212      ( 09 Dec 2023 07:50 )             44.9     12-09    138  |  105  |  12  ----------------------------<  96  4.0   |  28  |  0.71    Ca    9.0      09 Dec 2023 07:50    TPro  6.9  /  Alb  3.1<L>  /  TBili  0.8  /  DBili  x   /  AST  32  /  ALT  27  /  AlkPhos  109  12-09          < from: TTE Echo Complete w/o Contrast w/ Doppler (12.09.23 @ 11:46) >  Summary:   1. Left ventricular ejection fraction, by visual estimation, is 30 to   35%.   2. Severely decreased global left ventricular systolic function.   3. There is moderate concentric left ventricular hypertrophy.   4. Mild mitral annular calcification.   5. Mild thickening and calcification of the anterior and posterior   mitral valve leaflets.   6. Moderate mitral valve regurgitation.   7. Mild tricuspid regurgitation.   8. Mild aortic regurgitation.   9. Sclerotic aortic valve with mildly decreased opening.      [id number] Erika Nicole MD  Electronically signed on 12/9/2023 at 5:08:56 PM    < end of copied text >   CARDIOLOGY CONSULT NOTE    Patient is a 73y Male with a known history of :    HPI:  72yo man with a PMH LE edema, Hepatitis C, Heroin abuse on Methadone c/o palpitations and SOB x 1-2 days.  No chest pain.  No orthopnea.  No cough / fever / chills.  Admits to increased chronic leg pain, took extra doses of Methadone (prescribed in methadone clinic weekly) and had not been on methadone x 2 days.  Found to be in Afib with RVR in ED, given Cardizem with some improvement in rate and symptoms.  No additional complaints.  Denies h/o Afib.   EKG: afib w/ RVR 143bpm  Tele afib 110-120s  TSH 0.8  BNP 5200  Trop neg x 2    12/10/23:  Tele: HRs 80-90s  Echo: LVEF 30-35%, LVH, mod MR, mild AS    REVIEW OF SYSTEMS:  CONSTITUTIONAL: No fever, weight loss, or fatigue  EYES: No eye pain, visual disturbances, or discharge  ENMT:  No difficulty hearing, tinnitus, vertigo; No sinus or throat pain  NECK: No pain or stiffness  BREASTS: No pain, masses, or nipple discharge  RESPIRATORY: No cough, wheezing, chills or hemoptysis; No shortness of breath  CARDIOVASCULAR: No chest pain, palpitations, dizziness, or leg swelling  GASTROINTESTINAL: No abdominal or epigastric pain. No nausea, vomiting, or hematemesis; No diarrhea or constipation. No melena or hematochezia.  GENITOURINARY: No dysuria, frequency, hematuria, or incontinence  NEUROLOGICAL: No headaches, memory loss, loss of strength, numbness, or tremors  SKIN: No itching, burning, rashes, or lesions   LYMPH NODES: No enlarged glands  ENDOCRINE: No heat or cold intolerance; No hair loss  MUSCULOSKELETAL: No joint pain or swelling; No muscle, back, or extremity pain  PSYCHIATRIC: No depression, anxiety, mood swings, or difficulty sleeping  HEME/LYMPH: No easy bruising, or bleeding gums  ALLERGY AND IMMUNOLOGIC: No hives or eczema    MEDICATIONS  (STANDING):  enoxaparin Injectable 80 milliGRAM(s) SubCutaneous every 12 hours  ibuprofen  Tablet. 400 milliGRAM(s) Oral three times a day  influenza  Vaccine (HIGH DOSE) 0.7 milliLiter(s) IntraMuscular once  metoprolol tartrate 25 milliGRAM(s) Oral every 12 hours    MEDICATIONS  (PRN):      ALLERGIES: No Known Allergies      FAMILY HISTORY:      PHYSICAL EXAMINATION:  -----------------------------  Vital Signs Last 24 Hrs  T(C): 36.4 (10 Dec 2023 04:40), Max: 36.7 (09 Dec 2023 11:04)  T(F): 97.6 (10 Dec 2023 04:40), Max: 98 (09 Dec 2023 11:04)  HR: 96 (10 Dec 2023 04:40) (84 - 96)  BP: 138/93 (10 Dec 2023 04:40) (105/78 - 138/93)  RR: 18 (10 Dec 2023 04:40) (18 - 18)  SpO2: 97% (10 Dec 2023 04:40) (95% - 100%)      Constitutional: well developed, normal appearance, well groomed, well nourished, no deformities and no acute distress.   Eyes: the conjunctiva exhibited no abnormalities and the eyelids demonstrated no xanthelasmas.   HEENT: normal oral mucosa, no oral pallor and no oral cyanosis.   Neck: normal jugular venous A waves present, normal jugular venous V waves present and no jugular venous evans A waves.   Pulmonary: no respiratory distress, normal respiratory rhythm and effort, no accessory muscle use and lungs were clear to auscultation bilaterally.   Cardiovascular: tachy irreg irreg  Abdomen: soft, non-tender, no hepato-splenomegaly and no abdominal mass palpated.   Musculoskeletal: the gait could not be assessed..   Extremities: no clubbing of the fingernails, no localized cyanosis, no petechial hemorrhages and no ischemic changes.   Skin: normal skin color and pigmentation, no rash, no venous stasis, no skin lesions, no skin ulcer and no xanthoma was observed.   Psychiatric: oriented to person, place, and time, the affect was normal, the mood was normal and not feeling anxious.       LABS:   --------                        14.9   8.41  )-----------( 212      ( 09 Dec 2023 07:50 )             44.9     12-09    138  |  105  |  12  ----------------------------<  96  4.0   |  28  |  0.71    Ca    9.0      09 Dec 2023 07:50    TPro  6.9  /  Alb  3.1<L>  /  TBili  0.8  /  DBili  x   /  AST  32  /  ALT  27  /  AlkPhos  109  12-09          < from: TTE Echo Complete w/o Contrast w/ Doppler (12.09.23 @ 11:46) >  Summary:   1. Left ventricular ejection fraction, by visual estimation, is 30 to   35%.   2. Severely decreased global left ventricular systolic function.   3. There is moderate concentric left ventricular hypertrophy.   4. Mild mitral annular calcification.   5. Mild thickening and calcification of the anterior and posterior   mitral valve leaflets.   6. Moderate mitral valve regurgitation.   7. Mild tricuspid regurgitation.   8. Mild aortic regurgitation.   9. Sclerotic aortic valve with mildly decreased opening.      [id number] Erika Nicole MD  Electronically signed on 12/9/2023 at 5:08:56 PM    < end of copied text >

## 2023-12-10 NOTE — CHART NOTE - NSCHARTNOTEFT_GEN_A_CORE
Medicine PA Note    Notified by Rn that pharmacy can't dispense an additional dose of methadone 40 mg ordered by Dr Marie . As per conversation with Pharmacist Iesha, methadone hasn't been verified by clinic yet and as such only 10 mg every 2 hrs up to 40 mg can be given as per hospital policy. Will sign out pt to morning  ACP to verify the dose of methadone with clinic [facility] [phone].    Rosa Isela [facility] Medicine PA Note    Notified by Rn that pharmacy can't dispense an additional dose of methadone 40 mg ordered by Dr Marie . As per conversation with Pharmacist Iesha, methadone hasn't been verified by clinic yet and as such only 10 mg every 2 hrs up to 40 mg can be given as per hospital policy. Will sign out pt to morning  ACP to verify the dose of methadone with clinic [facility] [phone].    Rosa Isela [facility] Medicine PA Note    Notified by Rn that pharmacy can't dispense an additional dose of methadone 40 mg ordered by Dr Marie . As per conversation with Pharmacist Iesha, methadone hasn't been verified by clinic yet and as such only 10 mg every 2 hrs up to 40 mg can be given as per hospital policy. Will sign out pt to morning  ACP to verify the dose of methadone with clinic [facility] [phone].    Rosa Isela [facility]      Addendum  Patient is upset and aggravated that no one could verify his dose all weekend. Explained to patient again hospital policy of methadone. Patient is requesting to speak with a doctor. Hospitalist made aware.    Rosa Isela [facility] Medicine PA Note    Notified by Rn that pharmacy can't dispense an additional dose of methadone 40 mg ordered by Dr Marie . As per conversation with Pharmacist Iesha, methadone hasn't been verified by clinic yet and as such only 10 mg every 2 hrs up to 40 mg can be given as per hospital policy. Will sign out pt to morning  ACP to verify the dose of methadone with clinic [facility] [phone].    Rosa Isela [facility]      Addendum  Patient is upset and aggravated that no one could verify his dose all weekend. Explained to patient again hospital policy of methadone. Patient is requesting to speak with a doctor. Hospitalist made aware.    Rosa Isela [facility] Medicine PA Note    Notified by Rn that pharmacy can't dispense an additional dose of methadone 40 mg ordered by Dr Marie . As per conversation with Pharmacist Iesha, methadone hasn't been verified by clinic yet and as such only 10 mg every 2 hrs up to 40 mg can be given as per hospital policy. Will sign out pt to morning  ACP to verify the dose of methadone with clinic [facility] [phone].    MBleroy [facility]      Addendum  Patient is upset and aggravated that no one could verify his dose all weekend. Explained to patient again hospital policy of methadone. Patient is requesting to speak with a doctor. Hospitalist made aware.    MBleroy [facility]      Addendum  Spoke with Madie Lopez RN [phone]  at clinic who verified dosing of Methadone 90 mg daily    Rosa Isela [facility] Medicine PA Note    Notified by Rn that pharmacy can't dispense an additional dose of methadone 40 mg ordered by Dr Marie . As per conversation with Pharmacist Iesha, methadone hasn't been verified by clinic yet and as such only 10 mg every 2 hrs up to 40 mg can be given as per hospital policy. Will sign out pt to morning  ACP to verify the dose of methadone with clinic [facility] [phone].    MBleroy [facility]      Addendum  Patient is upset and aggravated that no one could verify his dose all weekend. Explained to patient again hospital policy of methadone. Patient is requesting to speak with a doctor. Hospitalist made aware.    MBleroy [facility]      Addendum  Spoke with Madie Lopez RN [phone]  at clinic who verified dosing of Methadone 90 mg daily    [facility]osa Is[facility]

## 2023-12-10 NOTE — DIETITIAN INITIAL EVALUATION ADULT - DIET TYPE
Add Alminder Standard 1.0 nutritional supplement once per day which provides 325 calories, 16 grams protein Add Prepared Response Standard 1.0 nutritional supplement once per day which provides 325 calories, 16 grams protein

## 2023-12-10 NOTE — DIETITIAN INITIAL EVALUATION ADULT - PERTINENT LABORATORY DATA
12-09    138  |  105  |  12  ----------------------------<  96  4.0   |  28  |  0.71    Ca    9.0      09 Dec 2023 07:50    TPro  6.9  /  Alb  3.1<L>  /  TBili  0.8  /  DBili  x   /  AST  32  /  ALT  27  /  AlkPhos  109  12-09

## 2023-12-11 ENCOUNTER — TRANSCRIPTION ENCOUNTER (OUTPATIENT)
Age: 73
End: 2023-12-11

## 2023-12-11 VITALS
SYSTOLIC BLOOD PRESSURE: 111 MMHG | TEMPERATURE: 98 F | HEART RATE: 79 BPM | OXYGEN SATURATION: 97 % | RESPIRATION RATE: 18 BRPM | DIASTOLIC BLOOD PRESSURE: 76 MMHG

## 2023-12-11 LAB
ANION GAP SERPL CALC-SCNC: 5 MMOL/L — SIGNIFICANT CHANGE UP (ref 5–17)
ANION GAP SERPL CALC-SCNC: 5 MMOL/L — SIGNIFICANT CHANGE UP (ref 5–17)
BUN SERPL-MCNC: 9 MG/DL — SIGNIFICANT CHANGE UP (ref 7–23)
BUN SERPL-MCNC: 9 MG/DL — SIGNIFICANT CHANGE UP (ref 7–23)
CALCIUM SERPL-MCNC: 8.8 MG/DL — SIGNIFICANT CHANGE UP (ref 8.5–10.1)
CALCIUM SERPL-MCNC: 8.8 MG/DL — SIGNIFICANT CHANGE UP (ref 8.5–10.1)
CHLORIDE SERPL-SCNC: 105 MMOL/L — SIGNIFICANT CHANGE UP (ref 96–108)
CHLORIDE SERPL-SCNC: 105 MMOL/L — SIGNIFICANT CHANGE UP (ref 96–108)
CO2 SERPL-SCNC: 30 MMOL/L — SIGNIFICANT CHANGE UP (ref 22–31)
CO2 SERPL-SCNC: 30 MMOL/L — SIGNIFICANT CHANGE UP (ref 22–31)
CREAT SERPL-MCNC: 0.74 MG/DL — SIGNIFICANT CHANGE UP (ref 0.5–1.3)
CREAT SERPL-MCNC: 0.74 MG/DL — SIGNIFICANT CHANGE UP (ref 0.5–1.3)
EGFR: 96 ML/MIN/1.73M2 — SIGNIFICANT CHANGE UP
EGFR: 96 ML/MIN/1.73M2 — SIGNIFICANT CHANGE UP
GLUCOSE SERPL-MCNC: 123 MG/DL — HIGH (ref 70–99)
GLUCOSE SERPL-MCNC: 123 MG/DL — HIGH (ref 70–99)
HCV RNA FLD QL NAA+PROBE: SIGNIFICANT CHANGE UP
HCV RNA FLD QL NAA+PROBE: SIGNIFICANT CHANGE UP
HCV RNA SPEC QL PROBE+SIG AMP: DETECTED
HCV RNA SPEC QL PROBE+SIG AMP: DETECTED
POTASSIUM SERPL-MCNC: 4.3 MMOL/L — SIGNIFICANT CHANGE UP (ref 3.5–5.3)
POTASSIUM SERPL-MCNC: 4.3 MMOL/L — SIGNIFICANT CHANGE UP (ref 3.5–5.3)
POTASSIUM SERPL-SCNC: 4.3 MMOL/L — SIGNIFICANT CHANGE UP (ref 3.5–5.3)
POTASSIUM SERPL-SCNC: 4.3 MMOL/L — SIGNIFICANT CHANGE UP (ref 3.5–5.3)
SODIUM SERPL-SCNC: 140 MMOL/L — SIGNIFICANT CHANGE UP (ref 135–145)
SODIUM SERPL-SCNC: 140 MMOL/L — SIGNIFICANT CHANGE UP (ref 135–145)

## 2023-12-11 PROCEDURE — 99232 SBSQ HOSP IP/OBS MODERATE 35: CPT

## 2023-12-11 RX ORDER — METHADONE HYDROCHLORIDE 40 MG/1
10 TABLET ORAL ONCE
Refills: 0 | Status: DISCONTINUED | OUTPATIENT
Start: 2023-12-11 | End: 2023-12-11

## 2023-12-11 RX ORDER — FUROSEMIDE 40 MG
1 TABLET ORAL
Qty: 30 | Refills: 0
Start: 2023-12-11

## 2023-12-11 RX ORDER — METHADONE HYDROCHLORIDE 40 MG/1
80 TABLET ORAL ONCE
Refills: 0 | Status: DISCONTINUED | OUTPATIENT
Start: 2023-12-11 | End: 2023-12-11

## 2023-12-11 RX ORDER — APIXABAN 2.5 MG/1
1 TABLET, FILM COATED ORAL
Qty: 60 | Refills: 0
Start: 2023-12-11 | End: 2024-01-09

## 2023-12-11 RX ORDER — METOPROLOL TARTRATE 50 MG
1 TABLET ORAL
Qty: 90 | Refills: 0
Start: 2023-12-11

## 2023-12-11 RX ORDER — APIXABAN 2.5 MG/1
5 TABLET, FILM COATED ORAL EVERY 12 HOURS
Refills: 0 | Status: DISCONTINUED | OUTPATIENT
Start: 2023-12-11 | End: 2023-12-11

## 2023-12-11 RX ORDER — APIXABAN 5 MG/1
1 TABLET, FILM COATED ORAL
Qty: 60 | Refills: 0
Start: 2023-12-11 | End: 2024-10-07

## 2023-12-11 RX ORDER — METHADONE HYDROCHLORIDE 40 MG/1
90 TABLET ORAL DAILY
Refills: 0 | Status: DISCONTINUED | OUTPATIENT
Start: 2023-12-12 | End: 2023-12-11

## 2023-12-11 RX ORDER — FUROSEMIDE 40 MG
1 TABLET ORAL
Refills: 0 | DISCHARGE

## 2023-12-11 RX ORDER — SACUBITRIL AND VALSARTAN 24; 26 MG/1; MG/1
1 TABLET, FILM COATED ORAL
Qty: 60 | Refills: 0
Start: 2023-12-11

## 2023-12-11 RX ORDER — METHADONE HYDROCHLORIDE 40 MG/1
90 TABLET ORAL DAILY
Refills: 0 | Status: DISCONTINUED | OUTPATIENT
Start: 2023-12-11 | End: 2023-12-11

## 2023-12-11 RX ORDER — APIXABAN 2.5 MG/1
1 TABLET, FILM COATED ORAL
Qty: 60 | Refills: 0
Start: 2023-12-11

## 2023-12-11 RX ADMIN — SACUBITRIL AND VALSARTAN 1 TABLET(S): 24; 26 TABLET, FILM COATED ORAL at 05:54

## 2023-12-11 RX ADMIN — ENOXAPARIN SODIUM 80 MILLIGRAM(S): 100 INJECTION SUBCUTANEOUS at 05:54

## 2023-12-11 RX ADMIN — METHADONE HYDROCHLORIDE 10 MILLIGRAM(S): 40 TABLET ORAL at 02:41

## 2023-12-11 RX ADMIN — METHADONE HYDROCHLORIDE 80 MILLIGRAM(S): 40 TABLET ORAL at 09:42

## 2023-12-11 RX ADMIN — Medication 50 MILLIGRAM(S): at 05:54

## 2023-12-11 NOTE — DISCHARGE NOTE PROVIDER - NSDCMRMEDTOKEN_GEN_ALL_CORE_FT
apixaban 5 mg oral tablet: 1 tab(s) orally every 12 hours  Lasix 20 mg oral tablet: 1 tab(s) orally once a day  methadone 10 mg/5 mL oral solution: 90 milligram(s) orally once a day  metoprolol tartrate 50 mg oral tablet: 1 tab(s) orally every 8 hours  sacubitril-valsartan 24 mg-26 mg oral tablet: 1 tab(s) orally 2 times a day

## 2023-12-11 NOTE — DISCHARGE NOTE PROVIDER - DISCHARGE SERVICE FOR PATIENT
SCHEDULING [facility] LAB APPOINTMENTS ONLINE    Lab appointments can now be scheduled online at [email or web address]    · Go to [email or web address]  · In Search type Lab  · Click \"Lab services\"  · Click \"Schedule Your Test Online\"  · Follow the prompts  · If you a member of your immediate family has had colon cancer, especially if their cancer occurred before they were 48years old. Prostate cancer tests:  The older way of looking for prostate cancer, the rectal exam, is no longer regarded as the best way to scre and urine tests. When you have no symptoms of illness, you should discuss the pros and cons of these and other tests with your healthcare provider. Each test involves some expense. What shots do I need?    The following shots are recommended for adults: expect your healthcare provider to advise you regularly on other ways to stay healthy. Some of these may include:   Substance use: Do not use tobacco or illegal drugs.  Avoid using alcohol while driving, swimming, boating, etc.   Diet and exercise: Try to m on the discharge service for the patient. I have reviewed and made amendments to the documentation where necessary.

## 2023-12-11 NOTE — DISCHARGE NOTE PROVIDER - CARE PROVIDERS DIRECT ADDRESSES
,DirectAddress_Unknown,[email or web address],DirectAddress_Unknown ,DirectAddress_Unknown,[email or web address],DirectAddress_Unknown

## 2023-12-11 NOTE — DISCHARGE NOTE PROVIDER - CARE PROVIDER_API CALL
Primary MD,   Phone: (   )    -  Fax: (   )    -  Follow Up Time:     Erika Nicole  Interventional Cardiology  [address] [place] [address]  Phone: [phone]  Fax: [phone]  Follow Up Time:     Sonal Toney  Gastroenterology  [address] [place] [address]  Phone: [phone]  Fax: [phone]  Follow Up Time:    Primary MD,   Phone: (   )    -  Fax: (   )    -  Follow Up Time:     Erika Nicole  Interventional Cardiology  [address] [place] [address]  Phone: [phone]  Fax: [phone]  Follow Up Time:     Sonal Toney  Gastroenterology  [address] [place] [address]  Phone: [phone]  Fax: [phone]  Follow Up Time:

## 2023-12-11 NOTE — DISCHARGE NOTE PROVIDER - NSDCFUADDINST_GEN_ALL_CORE_FT
It is important to see your primary physician as well as any specialty physicians within the next week to perform a comprehensive medical review.  Call their offices for an appointment as soon as you leave the hospital.  You will also need to see them for renewal of your medications.  If have any difficulty following with a physician, contact the [facility] [phone] or via [email or web address].   To obtain your results, you can access the [employer] Patient Portal at [email or web address].  Your medical issues appear to be stable at this time, but if your symptoms recur or worsen, contact your physicians and/or return to the hospital if necessary.  If you encounter any issues or questions with your medication, call your physicians before stopping the medication.  Do not drive.  Limit your diet to 2 grams of sodium daily.  It is important to see your primary physician as well as any specialty physicians within the next week to perform a comprehensive medical review.  Call their offices for an appointment as soon as you leave the hospital.  You will also need to see them for renewal of your medications.  If have any difficulty following with a physician, contact the [facility] [phone] or via [email or web address].   To obtain your results, you can access the [employer] Patient Portal at [email or web address].  Your medical issues appear to be stable at this time, but if your symptoms recur or worsen, contact your physicians and/or return to the hospital if necessary.  If you encounter any issues or questions with your medication, call your physicians before stopping the medication.  Do not drive.  Limit your diet to 2 grams of sodium daily.

## 2023-12-11 NOTE — DISCHARGE NOTE PROVIDER - NSDCFUSCHEDAPPT_GEN_ALL_CORE_FT
[facility]  CARDIOLOGY [address]   Scheduled Appointment: 01/03/2024     [facility]  CARDIOLOGY [address]   Scheduled Appointment: 01/03/2024     Ilene Hoover  [facility] Physician [facility]  INTMED [address]  Scheduled Appointment: 12/14/2023    Davin Ureña  [facility] Physician [facility]  CARDIOLOGY [address]  Scheduled Appointment: 01/03/2024    Brunner, Robert J  [facility] Physician [facility]  [address]STRO [address] [facility] [address]  Scheduled Appointment: 01/04/2024     Ilene Hoover  [facility] Physician [facility]  INTMED [address]  Scheduled Appointment: 12/14/2023    Davin Ureña  [facility] Physician [facility]  CARDIOLOGY [address]  Scheduled Appointment: 01/03/2024    Brunner, Robert J  [facility] Physician [facility]  [address]STR[address] [facility] [address]  Scheduled Appointment: 01/04/2024

## 2023-12-11 NOTE — DISCHARGE NOTE NURSING/CASE MANAGEMENT/SOCIAL WORK - NSTRANSFERBELONGINGSRESP_GEN_A_NUR
KALA Holloway  [facility]             Please call family- I see he was in the ED with a lip laceration yesterday; does he need follow up?  I can't tell if they had to suture it or not   Please see how he's doing  Sandie Paige      yes

## 2023-12-11 NOTE — DISCHARGE NOTE PROVIDER - NSDCFUADDAPPT_GEN_ALL_CORE_FT
APPTS ARE READY TO BE MADE: [X] YES    Best Family or Patient Contact (if needed):    Additional Information about above appointments (if needed):    1: Hepatitis C is treatable!  See your doctors to begin treatment.  Delay may result in further disability / impairment / death.   2:   3:     Other comments or requests:    APPTS ARE READY TO BE MADE: [X] YES    Best Family or Patient Contact (if needed):    Additional Information about above appointments (if needed):    1: Hepatitis C is treatable!  See your doctors to begin treatment.  Delay may result in further disability / impairment / death.   2:   3:     Other comments or requests:     Patient is scheduled to establish care with Dr. Ureña at 11:15AM on 1/3 at [address] [place] [address]. A message was sent to the office requesting an appointment within 1 week. APPTS ARE READY TO BE MADE: [X] YES    Best Family or Patient Contact (if needed):    Additional Information about above appointments (if needed):    1: Hepatitis C is treatable!  See your doctors to begin treatment.  Delay may result in further disability / impairment / death.   2:   3:     Other comments or requests:     Patient is scheduled to establish care with Dr. Ureña at 11:15AM on 1/3 at [address] [place] [address]. A message was sent to the office requesting an appointment within 1 week. APPTS ARE READY TO BE MADE: [X] YES    Best Family or Patient Contact (if needed):    Additional Information about above appointments (if needed):    1: Hepatitis C is treatable!  See your doctors to begin treatment.  Delay may result in further disability / impairment / death.   2:   3:     Other comments or requests:     Patient is scheduled to establish care with Dr. Ureña at 11:15AM on 1/3 at [address] [place] [address]. A message was sent to the office requesting an appointment within 1 week.    Patient is scheduled to establish care with Dr. Brunner at 11:00AM on 1/4, but a message was sent for an appointment within 1 week at their [place] office as their schedule is at capacity. APPTS ARE READY TO BE MADE: [X] YES    Best Family or Patient Contact (if needed):    Additional Information about above appointments (if needed):    1: Hepatitis C is treatable!  See your doctors to begin treatment.  Delay may result in further disability / impairment / death.   2:   3:     Other comments or requests:     Patient is scheduled to establish care with Dr. Ureña at 11:15AM on 1/3 at [address] [place] [address]. A message was sent to the office requesting an appointment within 1 week.    Patient is scheduled to establish care with Dr. Brunner at 11:00AM on 1/4, but a message was sent for an appointment within 1 week at their [place] office as their schedule is at capacity. APPTS ARE READY TO BE MADE: [X] YES    Best Family or Patient Contact (if needed):    Additional Information about above appointments (if needed):    1: Hepatitis C is treatable!  See your doctors to begin treatment.  Delay may result in further disability / impairment / death.   2:   3:     Other comments or requests:     Patient is scheduled to establish care with Dr. Hoover at 12:20PM on 12/14 at [address] [place] [address]    Patient is scheduled to establish care with Dr. Ureña at 11:15AM on 1/3 at [address] [place] [id number]. A message was sent to the office requesting an appointment within 1 week.    Patient is scheduled to establish care with Dr. Brunner at 11:00AM on 1/4, but a message was sent for an appointment within 1 week at their [place] office as their schedule is at capacity. APPTS ARE READY TO BE MADE: [X] YES    Best Family or Patient Contact (if needed):    Additional Information about above appointments (if needed):    1: Hepatitis C is treatable!  See your doctors to begin treatment.  Delay may result in further disability / impairment / death.   2:   3:     Other comments or requests:     Patient is scheduled to establish care with Dr. Hoover at 12:20PM on 12/14 at [address] [place] [address]    Patient is scheduled to establish care with Dr. Ureña at 11:15AM on 1/3 at [address] [place] [id number]. A message was sent to the office requesting an appointment within 1 week.    Patient is scheduled to establish care with Dr. Brunner at 11:00AM on 1/4, but a message was sent for an appointment within 1 week at their [place] office as their schedule is at capacity.

## 2023-12-11 NOTE — DISCHARGE NOTE NURSING/CASE MANAGEMENT/SOCIAL WORK - NSDCFUADDAPPT_GEN_ALL_CORE_FT
APPTS ARE READY TO BE MADE: [X] YES    Best Family or Patient Contact (if needed):    Additional Information about above appointments (if needed):    1: Hepatitis C is treatable!  See your doctors to begin treatment.  Delay may result in further disability / impairment / death.   2:   3:     Other comments or requests:

## 2023-12-11 NOTE — DISCHARGE NOTE PROVIDER - DETAILS OF MALNUTRITION DIAGNOSIS/DIAGNOSES
This patient has been assessed with a concern for Malnutrition and was treated during this hospitalization for the following Nutrition diagnosis/diagnoses:     -  12/10/2023: Severe protein-calorie malnutrition

## 2023-12-11 NOTE — DISCHARGE NOTE NURSING/CASE MANAGEMENT/SOCIAL WORK - NSDCPEFALRISK_GEN_ALL_CORE
For information on Fall & Injury Prevention, visit: [email or web address] OR  [email or web address] OR  https://www.cdc.gov/steadi/patient.html For information on Fall & Injury Prevention, visit: [email or web address] OR  [email or web address] OR  https://www.cdc.gov/steadi/patient.html

## 2023-12-11 NOTE — PROGRESS NOTE ADULT - NUTRITIONAL ASSESSMENT
This patient has been assessed with a concern for Malnutrition and has been determined to have a diagnosis/diagnoses of Severe protein-calorie malnutrition.    This patient is being managed with:   Diet DASH/TLC-  Sodium & Cholesterol Restricted  Entered: Dec  8 2023  7:27AM  
This patient has been assessed with a concern for Malnutrition and has been determined to have a diagnosis/diagnoses of Severe protein-calorie malnutrition.    This patient is being managed with:   Diet DASH/TLC-  Sodium & Cholesterol Restricted  Entered: Dec  8 2023  7:27AM

## 2023-12-11 NOTE — DISCHARGE NOTE NURSING/CASE MANAGEMENT/SOCIAL WORK - PATIENT PORTAL LINK FT
You can access the FollowMyHealth Patient Portal offered by [facility] by registering at the following website: [email or web address]. By joining [employer]’s FollowMyHealth portal, you will also be able to view your health information using other applications (apps) compatible with our system. You can access the FollowMyHealth Patient Portal offered by [facility] by registering at the following website: [email or web address]. By joining [employer]’s FollowMyHealth portal, you will also be able to view your health information using other applications (apps) compatible with our system.

## 2023-12-11 NOTE — DISCHARGE NOTE PROVIDER - NSDCCPCAREPLAN_GEN_ALL_CORE_FT
PRINCIPAL DISCHARGE DIAGNOSIS  Diagnosis: Acute on chronic systolic congestive heart failure  Assessment and Plan of Treatment:       SECONDARY DISCHARGE DIAGNOSES  Diagnosis: Other persistent atrial fibrillation  Assessment and Plan of Treatment:     Diagnosis: Severe protein-calorie malnutrition  Assessment and Plan of Treatment:     Diagnosis: CHF with cardiomyopathy  Assessment and Plan of Treatment:     Diagnosis: Methadone dependence  Assessment and Plan of Treatment:     Diagnosis: Chronic hepatitis C  Assessment and Plan of Treatment:

## 2023-12-11 NOTE — PROGRESS NOTE ADULT - SUBJECTIVE AND OBJECTIVE BOX
Patient: JORGE ZAPATA [id number] 73y Male                            Hospitalist Attending Note    No pain.  No chest pain / palpitations.   No SOB  No additional complaints.     ____________________PHYSICAL EXAM:  GENERAL:  NAD Alert and Oriented x 3   HEENT: NCAT  CARDIOVASCULAR:  S1, S2 irregular.   LUNGS: CTAB  ABDOMEN:  soft, (-) tenderness, (-) distension, (+) bowel sounds, (-) guarding, (-) rebound (-) rigidity  EXTREMITIES:  no cyanosis / clubbing / edema.   ____________________    VITALS:  Vital Signs Last 24 Hrs  T(C): 37 (11 Dec 2023 05:50), Max: 37.1 (11 Dec 2023 04:52)  T(F): 98.6 (11 Dec 2023 05:50), Max: 98.8 (11 Dec 2023 04:52)  HR: 101 (11 Dec 2023 08:12) (90 - 112)  BP: 125/87 (11 Dec 2023 05:50) (111/76 - 144/109)  BP(mean): --  RR: 18 (11 Dec 2023 05:50) (16 - 20)  SpO2: 97% (11 Dec 2023 05:50) (93% - 97%)    Parameters below as of 11 Dec 2023 05:50  Patient On (Oxygen Delivery Method): nasal cannula  O2 Flow (L/min): 2   Daily     Daily   CAPILLARY BLOOD GLUCOSE        I&O's Summary      LABS:    12-11    140  |  105  |  9   ----------------------------<  123<H>  4.3   |  30  |  0.74    Ca    8.8      11 Dec 2023 10:00          Urinalysis Basic - ( 11 Dec 2023 10:00 )    Color: x / Appearance: x / SG: x / pH: x  Gluc: 123 mg/dL / Ketone: x  / Bili: x / Urobili: x   Blood: x / Protein: x / Nitrite: x   Leuk Esterase: x / RBC: x / WBC x   Sq Epi: x / Non Sq Epi: x / Bacteria: x              MEDICATIONS:  acetaminophen     Tablet .. 650 milliGRAM(s) Oral every 6 hours PRN  apixaban 5 milliGRAM(s) Oral every 12 hours  diltiazem Injectable 10 milliGRAM(s) IV Push every 6 hours PRN  influenza  Vaccine (HIGH DOSE) 0.7 milliLiter(s) IntraMuscular once  metoprolol tartrate 50 milliGRAM(s) Oral every 8 hours  sacubitril 24 mG/valsartan 26 mG 1 Tablet(s) Oral two times a day                               Patient: JORGE ZAPATA [id number] 73y Male                            Hospitalist Attending Note    No pain.  No chest pain / palpitations.   No SOB  No additional complaints.     ____________________PHYSICAL EXAM:  GENERAL:  NAD Alert and Oriented x 3   HEENT: NCAT  CARDIOVASCULAR:  S1, S2 irregular.   LUNGS: CTAB  ABDOMEN:  soft, (-) tenderness, (-) distension, (+) bowel sounds, (-) guarding, (-) rebound (-) rigidity  EXTREMITIES:  no cyanosis / clubbing / edema.   ____________________    VITALS:  Vital Signs Last 24 Hrs  T(C): 37 (11 Dec 2023 05:50), Max: 37.1 (11 Dec 2023 04:52)  T(F): 98.6 (11 Dec 2023 05:50), Max: 98.8 (11 Dec 2023 04:52)  HR: 101 (11 Dec 2023 08:12) (90 - 112)  BP: 125/87 (11 Dec 2023 05:50) (111/76 - 144/109)  BP(mean): --  RR: 18 (11 Dec 2023 05:50) (16 - 20)  SpO2: 97% (11 Dec 2023 05:50) (93% - 97%)    Parameters below as of 11 Dec 2023 05:50  Patient On (Oxygen Delivery Method): nasal cannula  O2 Flow (L/min): 2   Daily     Daily   CAPILLARY BLOOD GLUCOSE        I&O's Summary      LABS:    12-11    140  |  105  |  9   ----------------------------<  123<H>  4.3   |  30  |  0.74    Ca    8.8      11 Dec 2023 10:00          Urinalysis Basic - ( 11 Dec 2023 10:00 )    Color: x / Appearance: x / SG: x / pH: x  Gluc: 123 mg/dL / Ketone: x  / Bili: x / Urobili: x   Blood: x / Protein: x / Nitrite: x   Leuk Esterase: x / RBC: x / WBC x   Sq Epi: x / Non Sq Epi: x / Bacteria: x              MEDICATIONS:  acetaminophen     Tablet .. 650 milliGRAM(s) Oral every 6 hours PRN  apixaban 5 milliGRAM(s) Oral every 12 hours  diltiazem Injectable 10 milliGRAM(s) IV Push every 6 hours PRN  influenza  Vaccine (HIGH DOSE) 0.7 milliLiter(s) IntraMuscular once  metoprolol tartrate 50 milliGRAM(s) Oral every 8 hours  sacubitril 24 mG/valsartan 26 mG 1 Tablet(s) Oral two times a day

## 2023-12-11 NOTE — PROGRESS NOTE ADULT - ASSESSMENT
73y Male PMH LE edema, Hepatitis C, Heroin abuse on Methadone found to have Afib with RVR.    # Persistent Afib with RVR - rate better controlled.  Continue Metoprolol.  Dick negative.  No chest pain.  TFTs wnl.  CHADS-Vasc score is 1.  Started AC.  Cardiology input appreciated.  Awaiting TTE.    # Acute CHF - started on Lasix.  TTE to assess EF.  # QT prolongation - QTC ~ 502.  Monitor on Tele, has been stable since admission.    # Heroin abuse on methadone - counselled on proper use of methadone.  He was able to show me his pillbottle showing a 90mg baseline dose, Rx'd for 12/4-12/10.  Attempted again to contact clinic  multiple times 8am-10am, no answer.  Discussed with pharmacy.  Continue Methadone 40mg daily, with additional 40mg if needed.    # H/o LE fracture, chronic pain - will check L knee Xray.  # h/o hepatitis C - Hep C viral PCR is positive.  D/w patient that there are treatments for hepatitis C, and that untreated hepatitis C could result if further liver failure / permanent disability / death. He acknowledges understanding, agreed to f/u with GI as outpatient.    # DVT Prophylaxis - OOB     
72yo man with a PMH LE edema, Hepatitis C, Heroin abuse on Methadone c/o palpitations and SOB x 1-2 days.  No chest pain.  No orthopnea.  No cough / fever / chills.  Admits to increased chronic leg pain, took extra doses of Methadone (prescribed in methadone clinic weekly) and had not been on methadone x 2 days.  Found to be in Afib with RVR in ED, given Cardizem with some improvement in rate and symptoms.  No additional complaints.  Denies h/o Afib.   EKG: afib w/ RVR 143bpm  Tele afib 110-120s  TSH 0.8  BNP 5200  Trop neg x 2    This AM:  Tele: HRs 80-90s  Echo: LVEF 30-35%, LVH, mod MR, mild AS      -monitor on tele.. HRs currently 80-90s  -2D echo w/ EF 30-35%... will start entrasto  -cont metoprolol for rate control of afib  -CHADSVASC score at least 1... would start AC  -would start low dose diuresis... 20mg PO daily  -monitor creat   -replete lytes  -ischemic eval as outpt 
73y Male PMH LE edema, Hepatitis C, Heroin abuse on Methadone found to have Afib with RVR.    < from: TTE Echo Complete w/o Contrast w/ Doppler (12.09.23 @ 11:46) >  Summary:   1. Left ventricular ejection fraction, by visual estimation, is 30 to   35%.   2. Severely decreased global left ventricular systolic function.   3. There is moderate concentric left ventricular hypertrophy.   4. Mild mitral annular calcification.   5. Mild thickening and calcification of the anterior and posterior   mitral valve leaflets.   6. Moderate mitral valve regurgitation.   7. Mild tricuspid regurgitation.   8. Mild aortic regurgitation.   9. Sclerotic aortic valve with mildly decreased opening.    < end of copied text >      # Persistent Afib with RVR - rate controlled.  Continue Metoprolol.  Dick negative.  No chest pain.  TFTs wnl.  CHADS-Vasc score is 1.  Started AC.  Cardiology input appreciated.  TTE as above.  Change to Eliquis  # Probable Acute on Chronic Systolic CHF - Pt recalls history of "weak heart", but has not followed with Cardiology.  Continue Lasix.  Started Entresto.  Discussed outpatient ischemic workup as outpatient.    # QT prolongation - QTC ~ 502.  Monitor on Tele, has been stable since admission.    # Heroin abuse on methadone - outpatient f/u at clinic  # H/o LE fracture, chronic pain - ambulating freely.  Outpatient f/u.    # h/o hepatitis C - Hep C viral PCR is positive.  D/w patient that there are treatments for hepatitis C, and that untreated hepatitis C could result if further liver failure / permanent disability / death. He acknowledges understanding, agreed to f/u with GI as outpatient.    # Severe Protein Calorie Malnutrition - Nutrition input, encourage po.  Supplements   # DVT Prophylaxis - OOB                 
73y Male PMH LE edema, Hepatitis C, Heroin abuse on Methadone found to have Afib with RVR.    < from: TTE Echo Complete w/o Contrast w/ Doppler (12.09.23 @ 11:46) >  Summary:   1. Left ventricular ejection fraction, by visual estimation, is 30 to   35%.   2. Severely decreased global left ventricular systolic function.   3. There is moderate concentric left ventricular hypertrophy.   4. Mild mitral annular calcification.   5. Mild thickening and calcification of the anterior and posterior   mitral valve leaflets.   6. Moderate mitral valve regurgitation.   7. Mild tricuspid regurgitation.   8. Mild aortic regurgitation.   9. Sclerotic aortic valve with mildly decreased opening.    < end of copied text >      # Persistent Afib with RVR - rate better controlled.  Continue Metoprolol.  Dick negative.  No chest pain.  TFTs wnl.  CHADS-Vasc score is 1.  Started AC.  Cardiology input appreciated.  Awaiting TTE.    # Probable Acute on Chronic Systolic CHF - Pt recalls history of "weak heart", but has not followed with Cardiology.  Continue Lasix.  Started Entresto.  Discussed outpatient ischemic workup.    # QT prolongation - QTC ~ 502.  Monitor on Tele, has been stable since admission.    # Heroin abuse on methadone - counselled on proper use of methadone.  He was able to show me his pillbottle showing a 90mg baseline dose, Rx'd for 12/4-12/10.  Attempted again to contact clinic  multiple times 8am-10am, no answer.  Discussed with pharmacy.  Continue Methadone 40mg daily, with additional 40mg if needed.    # H/o LE fracture, chronic pain - will check L knee Xray.  # h/o hepatitis C - Hep C viral PCR is positive.  D/w patient that there are treatments for hepatitis C, and that untreated hepatitis C could result if further liver failure / permanent disability / death. He acknowledges understanding, agreed to f/u with GI as outpatient.    # Severe Protein Calorie Malnutrition - Nutrition input, encourage po.  Supplements   # DVT Prophylaxis - OOB

## 2023-12-11 NOTE — DISCHARGE NOTE PROVIDER - HOSPITAL COURSE
73y Male PMH LE edema, Hepatitis C, Heroin abuse on Methadone found to have Afib with RVR.    < from: TTE Echo Complete w/o Contrast w/ Doppler (12.09.23 @ 11:46) >  Summary:   1. Left ventricular ejection fraction, by visual estimation, is 30 to   35%.   2. Severely decreased global left ventricular systolic function.   3. There is moderate concentric left ventricular hypertrophy.   4. Mild mitral annular calcification.   5. Mild thickening and calcification of the anterior and posterior   mitral valve leaflets.   6. Moderate mitral valve regurgitation.   7. Mild tricuspid regurgitation.   8. Mild aortic regurgitation.   9. Sclerotic aortic valve with mildly decreased opening.    < end of copied text >      # Persistent Afib with RVR - rate controlled.  Continue Metoprolol.  Dick negative.  No chest pain.  TFTs wnl.  CHADS-Vasc score is 1.  Started AC.  Cardiology input appreciated.  TTE as above.  Change to Eliquis  # Probable Acute on Chronic Systolic CHF - Pt recalls history of "weak heart", but has not followed with Cardiology.  Continue Lasix.  Started Entresto.  Discussed outpatient ischemic workup as outpatient.    # QT prolongation - QTC ~ 502.  Monitor on Tele, has been stable since admission.    # Heroin abuse on methadone - outpatient f/u at clinic  # H/o LE fracture, chronic pain - ambulating freely.  Outpatient f/u.    # h/o hepatitis C - Hep C viral PCR is positive.  D/w patient that there are treatments for hepatitis C, and that untreated hepatitis C could result if further liver failure / permanent disability / death. He acknowledges understanding, agreed to f/u with GI as outpatient.    # Severe Protein Calorie Malnutrition - Nutrition input, encourage po.  Supplements   # DVT Prophylaxis - OOB     Disposition: Stable for discharge.  Outpatient followup discussed.  Total time spent on discharge is  35  minutes.

## 2023-12-11 NOTE — DISCHARGE NOTE PROVIDER - PROVIDER TOKENS
FREE:[LAST:[Primary MD],PHONE:[(   )    -],FAX:[(   )    -]],PROVIDER:[TOKEN:[[id number]]],PROVIDER:[TOKEN:[[id number]]] FREE:[LAST:[Primary MD],PHONE:[(   )    -],FAX:[(   )    -]],PROVIDER:[TOKEN:[[id number]]],PROVIDER:[TOKEN:[[id number]]]

## 2023-12-13 DIAGNOSIS — B19.20 UNSPECIFIED VIRAL HEPATITIS C W/OUT HEPATIC COMA: ICD-10-CM

## 2023-12-14 DIAGNOSIS — R94.31 ABNORMAL ELECTROCARDIOGRAM [ECG] [EKG]: ICD-10-CM

## 2023-12-14 DIAGNOSIS — F11.20 OPIOID DEPENDENCE, UNCOMPLICATED: ICD-10-CM

## 2023-12-14 DIAGNOSIS — I48.91 UNSPECIFIED ATRIAL FIBRILLATION: ICD-10-CM

## 2023-12-14 DIAGNOSIS — I42.9 CARDIOMYOPATHY, UNSPECIFIED: ICD-10-CM

## 2023-12-14 DIAGNOSIS — B18.2 CHRONIC VIRAL HEPATITIS C: ICD-10-CM

## 2023-12-14 DIAGNOSIS — R60.0 LOCALIZED EDEMA: ICD-10-CM

## 2023-12-14 DIAGNOSIS — I48.19 OTHER PERSISTENT ATRIAL FIBRILLATION: ICD-10-CM

## 2023-12-14 DIAGNOSIS — G89.29 OTHER CHRONIC PAIN: ICD-10-CM

## 2023-12-14 DIAGNOSIS — E43 UNSPECIFIED SEVERE PROTEIN-CALORIE MALNUTRITION: ICD-10-CM

## 2023-12-14 DIAGNOSIS — I50.23 ACUTE ON CHRONIC SYSTOLIC (CONGESTIVE) HEART FAILURE: ICD-10-CM

## 2023-12-18 ENCOUNTER — TRANSCRIPTION ENCOUNTER (OUTPATIENT)
Age: 73
End: 2023-12-18

## 2023-12-19 ENCOUNTER — APPOINTMENT (OUTPATIENT)
Dept: GASTROENTEROLOGY | Facility: CLINIC | Age: 73
End: 2023-12-19
Payer: MEDICARE

## 2023-12-19 ENCOUNTER — APPOINTMENT (OUTPATIENT)
Dept: INTERNAL MEDICINE | Facility: CLINIC | Age: 73
End: 2023-12-19

## 2023-12-19 VITALS
DIASTOLIC BLOOD PRESSURE: 90 MMHG | HEART RATE: 78 BPM | BODY MASS INDEX: 23.47 KG/M2 | OXYGEN SATURATION: 98 % | WEIGHT: 173.31 LBS | HEIGHT: 72 IN | SYSTOLIC BLOOD PRESSURE: 154 MMHG

## 2023-12-19 DIAGNOSIS — Z13.228 ENCOUNTER FOR SCREENING FOR OTHER METABOLIC DISORDERS: ICD-10-CM

## 2023-12-19 DIAGNOSIS — Z13.6 ENCOUNTER FOR SCREENING FOR CARDIOVASCULAR DISORDERS: ICD-10-CM

## 2023-12-19 DIAGNOSIS — Z12.9 ENCOUNTER FOR SCREENING FOR MALIGNANT NEOPLASM, SITE UNSPECIFIED: ICD-10-CM

## 2023-12-19 PROCEDURE — 99203 OFFICE O/P NEW LOW 30 MIN: CPT

## 2023-12-19 RX ORDER — METHADONE HYDROCHLORIDE 10 MG/ML
10 CONCENTRATE ORAL
Refills: 0 | Status: ACTIVE | COMMUNITY

## 2023-12-19 NOTE — HISTORY OF PRESENT ILLNESS
[FreeTextEntry1] : Hebrer Lopez is a 73-year-old white male, ex IV drug user currently on methadone maintenance program at [facility] came for evaluation and management of hepatitis C. He reports he was sent by his methadone clinic for further management of hepatitis C.  He is aware of his diagnosis for more than 20 years.  He says he gets regular blood test at his methadone clinic and he was advised to see a gastroenterologist in the past which he  ignored Denied nausea, vomiting, abdominal pain, fever chills or any episodes of jaundice in the past.  He recently developed pedal edema for which he was prescribed Lasix 20 mg daily Denied chest pain, cardiopulmonary disease His appetite is reportedly good p.o. intake is good.  However he states he gets bowel movements once a month, he takes over-the-counter laxatives every day He is motivated to get treatment for hepatitis C.  I do not have any documentation or lab reports regarding hepatitis C

## 2023-12-19 NOTE — PHYSICAL EXAM
[Alert] : alert [Normal Voice/Communication] : normal voice/communication [Healthy Appearing] : healthy appearing [No Acute Distress] : no acute distress [Sclera] : the sclera and conjunctiva were normal [Hearing Threshold Finger Rub Not Otoe] : hearing was normal [Normal Lips/Gums] : the lips and gums were normal [Oropharynx] : the oropharynx was normal [Normal Appearance] : the appearance of the neck was normal [No Neck Mass] : no neck mass was observed [No Respiratory Distress] : no respiratory distress [No Acc Muscle Use] : no accessory muscle use [Respiration, Rhythm And Depth] : normal respiratory rhythm and effort [Auscultation Breath Sounds / Voice Sounds] : lungs were clear to auscultation bilaterally [Heart Rate And Rhythm] : heart rate was normal and rhythm regular [Normal S1, S2] : normal S1 and S2 [Murmurs] : no murmurs [+1] : edema: +1 [Bowel Sounds] : normal bowel sounds [Abdomen Tenderness] : non-tender [No Masses] : no abdominal mass palpated [Abdomen Soft] : soft [Supraclavicular Lymph Nodes Enlarged Bilaterally] : no supraclavicular lymphadenopathy [Cervical Lymph Nodes Enlarged Anterior Bilaterally] : no anterior cervical lymphadenopathy [No Spinal Tenderness] : no spinal tenderness [Abnormal Walk] : normal gait [Normal Color / Pigmentation] : normal skin color and pigmentation [] : no rash [No Focal Deficits] : no focal deficits [Oriented To Time, Place, And Person] : oriented to person, place, and time [de-identified] : Walks with cane support

## 2023-12-19 NOTE — ASSESSMENT
[FreeTextEntry1] : 73-year-old ex IV drug abuser known hepatitis C and positive PCR came for seeking medical treatment I explained to him different antiviral medications available and there side effects and the outcome Patient is motivated to get treatment Advised him to bring his previous lab records We will decide any further blood test sonogram to be done

## 2023-12-19 NOTE — HEALTH RISK ASSESSMENT
[0] : 2) Feeling down, depressed, or hopeless: Not at all (0) [PHQ-2 Negative - No further assessment needed] : PHQ-2 Negative - No further assessment needed [[id number]] : 0 [Never] : Never

## 2023-12-19 NOTE — HISTORY OF PRESENT ILLNESS
[Post-hospitalization from ___ Hospital] : Post-hospitalization from [unfilled] Hospital [Discharge Summary] : discharge summary [Pertinent Labs] : pertinent labs [Radiology Findings] : radiology findings [Discharge Med List] : discharge medication list [Med Reconciliation] : medication reconciliation has been completed

## 2023-12-20 PROBLEM — B19.20 HEPATITIS C VIRUS INFECTION WITHOUT HEPATIC COMA, UNSPECIFIED CHRONICITY: Status: ACTIVE | Noted: 2023-12-20

## 2023-12-22 ENCOUNTER — APPOINTMENT (OUTPATIENT)
Dept: GASTROENTEROLOGY | Facility: CLINIC | Age: 73
End: 2023-12-22
Payer: MEDICARE

## 2023-12-22 VITALS — OXYGEN SATURATION: 98 % | HEART RATE: 65 BPM | DIASTOLIC BLOOD PRESSURE: 83 MMHG | SYSTOLIC BLOOD PRESSURE: 139 MMHG

## 2023-12-22 PROCEDURE — 99213 OFFICE O/P EST LOW 20 MIN: CPT

## 2023-12-22 RX ORDER — VELPATASVIR AND SOFOSBUVIR 100; 400 MG/1; MG/1
400-100 TABLET, FILM COATED ORAL DAILY
Qty: 28 | Refills: 2 | Status: ACTIVE | COMMUNITY
Start: 2023-12-22 | End: 1900-01-01

## 2023-12-22 NOTE — PHYSICAL EXAM
[Alert] : alert [Normal Voice/Communication] : normal voice/communication [Healthy Appearing] : healthy appearing [No Acute Distress] : no acute distress [Sclera] : the sclera and conjunctiva were normal [Hearing Threshold Finger Rub Not [place]] : hearing was normal [Normal Lips/Gums] : the lips and gums were normal [Oropharynx] : the oropharynx was normal [Normal Appearance] : the appearance of the neck was normal [No Neck Mass] : no neck mass was observed [No Respiratory Distress] : no respiratory distress [No Acc Muscle Use] : no accessory muscle use [Respiration, Rhythm And Depth] : normal respiratory rhythm and effort [Auscultation Breath Sounds / Voice Sounds] : lungs were clear to auscultation bilaterally [Heart Rate And Rhythm] : heart rate was normal and rhythm regular [Normal S1, S2] : normal S1 and S2 [Murmurs] : no murmurs [+1] : edema: +1 [Bowel Sounds] : normal bowel sounds [Abdomen Tenderness] : non-tender [No Masses] : no abdominal mass palpated [Abdomen Soft] : soft [Supraclavicular Lymph Nodes Enlarged Bilaterally] : no supraclavicular lymphadenopathy [Cervical Lymph Nodes Enlarged Anterior Bilaterally] : no anterior cervical lymphadenopathy [No Spinal Tenderness] : no spinal tenderness [Abnormal Walk] : normal gait [Normal Color / Pigmentation] : normal skin color and pigmentation [] : no rash [No Focal Deficits] : no focal deficits [Oriented To Time, Place, And Person] : oriented to person, place, and time [de-identified] : Walks with cane support

## 2023-12-22 NOTE — HISTORY OF PRESENT ILLNESS
[FreeTextEntry1] : Herber Lopez came for follow-up, he brought his old records which indicated HCV load of 546,000's.  However there was no genotyping Hemoglobin and platelets were within normal range.  ALT was 47 He denied any symptoms at this time

## 2023-12-22 NOTE — ASSESSMENT
[FreeTextEntry1] : I reviewed his results and discussed with him the treatment plan which included antiviral treatment with Epclusa for 12 weeks I also ordered additional lab work including genotyping, and FibroSure. Ordered abdominal sonogram for imaging of the liver Prescribed Epclusa and will await authorization from his insurance plan

## 2023-12-26 ENCOUNTER — TRANSCRIPTION ENCOUNTER (OUTPATIENT)
Age: 73
End: 2023-12-26

## 2024-01-03 ENCOUNTER — APPOINTMENT (OUTPATIENT)
Dept: CARDIOLOGY | Facility: CLINIC | Age: 74
End: 2024-01-03

## 2024-03-04 NOTE — ED PROVIDER NOTE - CADM POA URETHRAL CATHETER
Plan: IPL and PDL briefly discussed and referred patient to Mariia Pham Initiate Treatment: A total of 1 lesion was treated with electrodesiccation, located on the nasal root. The patient's consent was obtained including but not limited to risks of crusting, scabbing, blistering, scarring, darker or lighter pigmentary change, recurrence, incomplete removal and infection. I reviewed with the patient in detail post-care instructions. Patient is to wear sunprotection, and avoid picking at any of the treated lesions. Pt may apply Vaseline to crusted or scabbing areas Detail Level: Zone No

## 2024-04-02 NOTE — ED PROVIDER NOTE - NSICDXPASTSURGICALHX_GEN_ALL_CORE_FT
Detail Level: Detailed
Detail Level: Zone
PAST SURGICAL HISTORY:  Hip fracture Left hip fracture s/p ORIF

## 2024-07-18 ENCOUNTER — EMERGENCY (EMERGENCY)
Facility: HOSPITAL | Age: 74
LOS: 0 days | Discharge: HOME | End: 2024-07-18
Attending: STUDENT IN AN ORGANIZED HEALTH CARE EDUCATION/TRAINING PROGRAM
Payer: MEDICARE

## 2024-07-18 VITALS
SYSTOLIC BLOOD PRESSURE: 172 MMHG | RESPIRATION RATE: 18 BRPM | HEART RATE: 120 BPM | TEMPERATURE: 100 F | HEIGHT: 72 IN | DIASTOLIC BLOOD PRESSURE: 94 MMHG | OXYGEN SATURATION: 95 % | WEIGHT: 179.9 LBS

## 2024-07-18 VITALS
RESPIRATION RATE: 14 BRPM | TEMPERATURE: 99 F | DIASTOLIC BLOOD PRESSURE: 70 MMHG | SYSTOLIC BLOOD PRESSURE: 111 MMHG | HEART RATE: 55 BPM | OXYGEN SATURATION: 95 %

## 2024-07-18 DIAGNOSIS — I48.91 UNSPECIFIED ATRIAL FIBRILLATION: ICD-10-CM

## 2024-07-18 DIAGNOSIS — B19.20 UNSPECIFIED VIRAL HEPATITIS C WITHOUT HEPATIC COMA: ICD-10-CM

## 2024-07-18 DIAGNOSIS — F11.10 OPIOID ABUSE, UNCOMPLICATED: ICD-10-CM

## 2024-07-18 DIAGNOSIS — Z20.822 CONTACT WITH AND (SUSPECTED) EXPOSURE TO COVID-19: ICD-10-CM
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
## 2024-07-18 DIAGNOSIS — Z91.199 PATIENT'S NONCOMPLIANCE WITH OTHER MEDICAL TREATMENT AND REGIMEN DUE TO UNSPECIFIED REASON: ICD-10-CM
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
## 2024-07-18 DIAGNOSIS — M25.561 PAIN IN RIGHT KNEE: ICD-10-CM

## 2024-07-18 DIAGNOSIS — S72.009A FRACTURE OF UNSPECIFIED PART OF NECK OF UNSPECIFIED FEMUR, INITIAL ENCOUNTER FOR CLOSED FRACTURE: Chronic | ICD-10-CM

## 2024-07-18 DIAGNOSIS — I48.92 UNSPECIFIED ATRIAL FLUTTER: ICD-10-CM

## 2024-07-18 LAB
ALBUMIN SERPL ELPH-MCNC: 3.7 G/DL — SIGNIFICANT CHANGE UP (ref 3.3–5)
ALP SERPL-CCNC: 126 U/L — HIGH (ref 40–120)
ALT FLD-CCNC: 31 U/L — SIGNIFICANT CHANGE UP (ref 12–78)
ANION GAP SERPL CALC-SCNC: 6 MMOL/L — SIGNIFICANT CHANGE UP (ref 5–17)
AST SERPL-CCNC: 36 U/L — SIGNIFICANT CHANGE UP (ref 15–37)
BASOPHILS # BLD AUTO: 0.03 K/UL — SIGNIFICANT CHANGE UP (ref 0–0.2)
BASOPHILS NFR BLD AUTO: 0.3 % — SIGNIFICANT CHANGE UP (ref 0–2)
BILIRUB SERPL-MCNC: 0.4 MG/DL — SIGNIFICANT CHANGE UP (ref 0.2–1.2)
BUN SERPL-MCNC: 18 MG/DL — SIGNIFICANT CHANGE UP (ref 7–23)
CALCIUM SERPL-MCNC: 9.1 MG/DL — SIGNIFICANT CHANGE UP (ref 8.5–10.1)
CHLORIDE SERPL-SCNC: 102 MMOL/L — SIGNIFICANT CHANGE UP (ref 96–108)
CO2 SERPL-SCNC: 28 MMOL/L — SIGNIFICANT CHANGE UP (ref 22–31)
CREAT SERPL-MCNC: 0.82 MG/DL — SIGNIFICANT CHANGE UP (ref 0.5–1.3)
EGFR: 93 ML/MIN/1.73M2 — SIGNIFICANT CHANGE UP
EOSINOPHIL # BLD AUTO: 0.04 K/UL — SIGNIFICANT CHANGE UP (ref 0–0.5)
EOSINOPHIL NFR BLD AUTO: 0.5 % — SIGNIFICANT CHANGE UP (ref 0–6)
FLUAV AG NPH QL: SIGNIFICANT CHANGE UP
FLUBV AG NPH QL: SIGNIFICANT CHANGE UP
GLUCOSE SERPL-MCNC: 98 MG/DL — SIGNIFICANT CHANGE UP (ref 70–99)
HCT VFR BLD CALC: 45.4 % — SIGNIFICANT CHANGE UP (ref 39–50)
HGB BLD-MCNC: 15.4 G/DL — SIGNIFICANT CHANGE UP (ref 13–17)
IMM GRANULOCYTES NFR BLD AUTO: 0.3 % — SIGNIFICANT CHANGE UP (ref 0–0.9)
LYMPHOCYTES # BLD AUTO: 0.98 K/UL — LOW (ref 1–3.3)
LYMPHOCYTES # BLD AUTO: 11.1 % — LOW (ref 13–44)
MCHC RBC-ENTMCNC: 32.4 PG — SIGNIFICANT CHANGE UP (ref 27–34)
MCHC RBC-ENTMCNC: 33.9 G/DL — SIGNIFICANT CHANGE UP (ref 32–36)
MCV RBC AUTO: 95.4 FL — SIGNIFICANT CHANGE UP (ref 80–100)
MONOCYTES # BLD AUTO: 1 K/UL — HIGH (ref 0–0.9)
MONOCYTES NFR BLD AUTO: 11.3 % — SIGNIFICANT CHANGE UP (ref 2–14)
NEUTROPHILS # BLD AUTO: 6.75 K/UL — SIGNIFICANT CHANGE UP (ref 1.8–7.4)
NEUTROPHILS NFR BLD AUTO: 76.5 % — SIGNIFICANT CHANGE UP (ref 43–77)
NRBC # BLD: 0 /100 WBCS — SIGNIFICANT CHANGE UP (ref 0–0)
PLATELET # BLD AUTO: 187 K/UL — SIGNIFICANT CHANGE UP (ref 150–400)
POTASSIUM SERPL-MCNC: 3.5 MMOL/L — SIGNIFICANT CHANGE UP (ref 3.5–5.3)
POTASSIUM SERPL-SCNC: 3.5 MMOL/L — SIGNIFICANT CHANGE UP (ref 3.5–5.3)
PROT SERPL-MCNC: 7.6 GM/DL — SIGNIFICANT CHANGE UP (ref 6–8.3)
RBC # BLD: 4.76 M/UL — SIGNIFICANT CHANGE UP (ref 4.2–5.8)
RBC # FLD: 12.7 % — SIGNIFICANT CHANGE UP (ref 10.3–14.5)
SARS-COV-2 RNA SPEC QL NAA+PROBE: SIGNIFICANT CHANGE UP
SODIUM SERPL-SCNC: 136 MMOL/L — SIGNIFICANT CHANGE UP (ref 135–145)
WBC # BLD: 8.83 K/UL — SIGNIFICANT CHANGE UP (ref 3.8–10.5)
WBC # FLD AUTO: 8.83 K/UL — SIGNIFICANT CHANGE UP (ref 3.8–10.5)

## 2024-07-18 PROCEDURE — 73562 X-RAY EXAM OF KNEE 3: CPT | Mod: 26,RT

## 2024-07-18 PROCEDURE — 99285 EMERGENCY DEPT VISIT HI MDM: CPT

## 2024-07-18 PROCEDURE — 93010 ELECTROCARDIOGRAM REPORT: CPT

## 2024-07-18 RX ORDER — METOPROLOL TARTRATE 50 MG
50 TABLET ORAL ONCE
Refills: 0 | Status: COMPLETED | OUTPATIENT
Start: 2024-07-18 | End: 2024-07-18

## 2024-07-18 RX ORDER — DILTIAZEM HYDROCHLORIDE 240 MG/1
20 CAPSULE, EXTENDED RELEASE ORAL ONCE
Refills: 0 | Status: COMPLETED | OUTPATIENT
Start: 2024-07-18 | End: 2024-07-18

## 2024-07-18 RX ORDER — ACETAMINOPHEN 325 MG
1000 TABLET ORAL ONCE
Refills: 0 | Status: COMPLETED | OUTPATIENT
Start: 2024-07-18 | End: 2024-07-18

## 2024-07-18 RX ORDER — SODIUM CHLORIDE 0.9 % (FLUSH) 0.9 %
1000 SYRINGE (ML) INJECTION ONCE
Refills: 0 | Status: COMPLETED | OUTPATIENT
Start: 2024-07-18 | End: 2024-07-18

## 2024-07-18 RX ADMIN — Medication 50 MILLIGRAM(S): at 04:27

## 2024-07-18 RX ADMIN — Medication 400 MILLIGRAM(S): at 03:06

## 2024-07-18 RX ADMIN — DILTIAZEM HYDROCHLORIDE 20 MILLIGRAM(S): 240 CAPSULE, EXTENDED RELEASE ORAL at 05:31

## 2024-07-18 RX ADMIN — Medication 1000 MILLILITER(S): at 03:06

## 2024-07-29 ENCOUNTER — APPOINTMENT (OUTPATIENT)
Dept: ORTHOPEDIC SURGERY | Facility: CLINIC | Age: 74
End: 2024-07-29

## 2024-07-29 DIAGNOSIS — M25.561 PAIN IN RIGHT KNEE: ICD-10-CM

## 2024-07-29 DIAGNOSIS — G89.29 PAIN IN RIGHT KNEE: ICD-10-CM

## 2024-07-29 DIAGNOSIS — M25.562 PAIN IN RIGHT KNEE: ICD-10-CM

## 2024-08-17 ENCOUNTER — INPATIENT (INPATIENT)
Facility: HOSPITAL | Age: 74
LOS: 0 days | Discharge: LEFT BEFORE DISCHARGE | End: 2024-08-17
Attending: HOSPITALIST | Admitting: HOSPITALIST
Payer: MEDICARE

## 2024-08-17 ENCOUNTER — TRANSCRIPTION ENCOUNTER (OUTPATIENT)
Age: 74
End: 2024-08-17

## 2024-08-17 VITALS
SYSTOLIC BLOOD PRESSURE: 120 MMHG | OXYGEN SATURATION: 98 % | DIASTOLIC BLOOD PRESSURE: 73 MMHG | RESPIRATION RATE: 18 BRPM | HEART RATE: 105 BPM | TEMPERATURE: 99 F

## 2024-08-17 VITALS
TEMPERATURE: 98 F | WEIGHT: 179.9 LBS | HEIGHT: 72 IN | HEART RATE: 155 BPM | SYSTOLIC BLOOD PRESSURE: 129 MMHG | RESPIRATION RATE: 22 BRPM | DIASTOLIC BLOOD PRESSURE: 88 MMHG | OXYGEN SATURATION: 99 %

## 2024-08-17 DIAGNOSIS — F43.20 ADJUSTMENT DISORDER, UNSPECIFIED: ICD-10-CM

## 2024-08-17 DIAGNOSIS — S72.009A FRACTURE OF UNSPECIFIED PART OF NECK OF UNSPECIFIED FEMUR, INITIAL ENCOUNTER FOR CLOSED FRACTURE: Chronic | ICD-10-CM

## 2024-08-17 LAB
ALBUMIN SERPL ELPH-MCNC: 3.6 G/DL — SIGNIFICANT CHANGE UP (ref 3.3–5)
ALP SERPL-CCNC: 129 U/L — HIGH (ref 40–120)
ALT FLD-CCNC: 46 U/L — SIGNIFICANT CHANGE UP (ref 12–78)
ANION GAP SERPL CALC-SCNC: 7 MMOL/L — SIGNIFICANT CHANGE UP (ref 5–17)
APPEARANCE UR: ABNORMAL
APTT BLD: 26.6 SEC — SIGNIFICANT CHANGE UP (ref 24.5–35.6)
AST SERPL-CCNC: 46 U/L — HIGH (ref 15–37)
BASOPHILS # BLD AUTO: 0.02 K/UL — SIGNIFICANT CHANGE UP (ref 0–0.2)
BASOPHILS NFR BLD AUTO: 0.2 % — SIGNIFICANT CHANGE UP (ref 0–2)
BILIRUB SERPL-MCNC: 0.6 MG/DL — SIGNIFICANT CHANGE UP (ref 0.2–1.2)
BILIRUB UR-MCNC: NEGATIVE — SIGNIFICANT CHANGE UP
BUN SERPL-MCNC: 15 MG/DL — SIGNIFICANT CHANGE UP (ref 7–23)
CALCIUM SERPL-MCNC: 9.1 MG/DL — SIGNIFICANT CHANGE UP (ref 8.5–10.1)
CHLORIDE SERPL-SCNC: 104 MMOL/L — SIGNIFICANT CHANGE UP (ref 96–108)
CO2 SERPL-SCNC: 27 MMOL/L — SIGNIFICANT CHANGE UP (ref 22–31)
COLOR SPEC: YELLOW — SIGNIFICANT CHANGE UP
CREAT SERPL-MCNC: 0.98 MG/DL — SIGNIFICANT CHANGE UP (ref 0.5–1.3)
DIFF PNL FLD: NEGATIVE — SIGNIFICANT CHANGE UP
EGFR: 81 ML/MIN/1.73M2 — SIGNIFICANT CHANGE UP
EGFR: 81 ML/MIN/1.73M2 — SIGNIFICANT CHANGE UP
EOSINOPHIL # BLD AUTO: 0.03 K/UL — SIGNIFICANT CHANGE UP (ref 0–0.5)
EOSINOPHIL NFR BLD AUTO: 0.3 % — SIGNIFICANT CHANGE UP (ref 0–6)
GLUCOSE SERPL-MCNC: 142 MG/DL — HIGH (ref 70–99)
GLUCOSE UR QL: NEGATIVE MG/DL — SIGNIFICANT CHANGE UP
HCT VFR BLD CALC: 50 % — SIGNIFICANT CHANGE UP (ref 39–50)
HGB BLD-MCNC: 16.5 G/DL — SIGNIFICANT CHANGE UP (ref 13–17)
IMM GRANULOCYTES NFR BLD AUTO: 0.4 % — SIGNIFICANT CHANGE UP (ref 0–0.9)
INR BLD: 0.98 RATIO — SIGNIFICANT CHANGE UP (ref 0.85–1.18)
KETONES UR-MCNC: NEGATIVE MG/DL — SIGNIFICANT CHANGE UP
LACTATE SERPL-SCNC: 2.4 MMOL/L — HIGH (ref 0.7–2)
LACTATE SERPL-SCNC: 2.6 MMOL/L — HIGH (ref 0.7–2)
LEUKOCYTE ESTERASE UR-ACNC: NEGATIVE — SIGNIFICANT CHANGE UP
LIDOCAIN IGE QN: 65 U/L — SIGNIFICANT CHANGE UP (ref 13–75)
LYMPHOCYTES # BLD AUTO: 0.99 K/UL — LOW (ref 1–3.3)
LYMPHOCYTES # BLD AUTO: 8.4 % — LOW (ref 13–44)
MAGNESIUM SERPL-MCNC: 2.1 MG/DL — SIGNIFICANT CHANGE UP (ref 1.6–2.6)
MCHC RBC-ENTMCNC: 31.7 PG — SIGNIFICANT CHANGE UP (ref 27–34)
MCHC RBC-ENTMCNC: 33 G/DL — SIGNIFICANT CHANGE UP (ref 32–36)
MCV RBC AUTO: 96 FL — SIGNIFICANT CHANGE UP (ref 80–100)
MONOCYTES # BLD AUTO: 1.37 K/UL — HIGH (ref 0–0.9)
MONOCYTES NFR BLD AUTO: 11.6 % — SIGNIFICANT CHANGE UP (ref 2–14)
NEUTROPHILS # BLD AUTO: 9.35 K/UL — HIGH (ref 1.8–7.4)
NEUTROPHILS NFR BLD AUTO: 79.1 % — HIGH (ref 43–77)
NITRITE UR-MCNC: NEGATIVE — SIGNIFICANT CHANGE UP
NRBC # BLD: 0 /100 WBCS — SIGNIFICANT CHANGE UP (ref 0–0)
NRBC BLD-RTO: 0 /100 WBCS — SIGNIFICANT CHANGE UP (ref 0–0)
NT-PROBNP SERPL-SCNC: 1976 PG/ML — HIGH (ref 0–125)
PH UR: 6 — SIGNIFICANT CHANGE UP (ref 5–8)
PLATELET # BLD AUTO: 153 K/UL — SIGNIFICANT CHANGE UP (ref 150–400)
POTASSIUM SERPL-MCNC: 3.6 MMOL/L — SIGNIFICANT CHANGE UP (ref 3.5–5.3)
POTASSIUM SERPL-SCNC: 3.6 MMOL/L — SIGNIFICANT CHANGE UP (ref 3.5–5.3)
PROT SERPL-MCNC: 7.8 GM/DL — SIGNIFICANT CHANGE UP (ref 6–8.3)
PROT UR-MCNC: SIGNIFICANT CHANGE UP MG/DL
PROTHROM AB SERPL-ACNC: 11.8 SEC — SIGNIFICANT CHANGE UP (ref 9.5–13)
RBC # BLD: 5.21 M/UL — SIGNIFICANT CHANGE UP (ref 4.2–5.8)
RBC # FLD: 12.6 % — SIGNIFICANT CHANGE UP (ref 10.3–14.5)
SODIUM SERPL-SCNC: 138 MMOL/L — SIGNIFICANT CHANGE UP (ref 135–145)
SP GR SPEC: 1.02 — SIGNIFICANT CHANGE UP (ref 1–1.03)
TROPONIN I, HIGH SENSITIVITY RESULT: 9.4 NG/L — SIGNIFICANT CHANGE UP
UROBILINOGEN FLD QL: 0.2 MG/DL — SIGNIFICANT CHANGE UP (ref 0.2–1)
WBC # BLD: 11.81 K/UL — HIGH (ref 3.8–10.5)
WBC # FLD AUTO: 11.81 K/UL — HIGH (ref 3.8–10.5)

## 2024-08-17 PROCEDURE — 99234 HOSP IP/OBS SM DT SF/LOW 45: CPT

## 2024-08-17 PROCEDURE — 90792 PSYCH DIAG EVAL W/MED SRVCS: CPT | Mod: 2W

## 2024-08-17 PROCEDURE — 71046 X-RAY EXAM CHEST 2 VIEWS: CPT | Mod: 26

## 2024-08-17 PROCEDURE — 99285 EMERGENCY DEPT VISIT HI MDM: CPT

## 2024-08-17 PROCEDURE — 93010 ELECTROCARDIOGRAM REPORT: CPT

## 2024-08-17 RX ORDER — APIXABAN 2.5 MG/1
1 TABLET, FILM COATED ORAL
Qty: 60 | Refills: 0
Start: 2024-08-17 | End: 2024-09-15

## 2024-08-17 RX ORDER — HEPARIN SODIUM 1000 [USP'U]/ML
3000 INJECTION INTRAVENOUS; SUBCUTANEOUS EVERY 6 HOURS
Refills: 0 | Status: DISCONTINUED | OUTPATIENT
Start: 2024-08-17 | End: 2024-08-17

## 2024-08-17 RX ORDER — HEPARIN SODIUM 1000 [USP'U]/ML
6500 INJECTION INTRAVENOUS; SUBCUTANEOUS ONCE
Refills: 0 | Status: COMPLETED | OUTPATIENT
Start: 2024-08-17 | End: 2024-08-17

## 2024-08-17 RX ORDER — METHADONE HCL 10 MG
90 TABLET ORAL ONCE
Refills: 0 | Status: DISCONTINUED | OUTPATIENT
Start: 2024-08-17 | End: 2024-08-17

## 2024-08-17 RX ORDER — METOPROLOL SUCCINATE 50 MG/1
1 TABLET, EXTENDED RELEASE ORAL
Refills: 0
Start: 2024-08-17

## 2024-08-17 RX ORDER — METOPROLOL SUCCINATE 50 MG/1
1 TABLET, EXTENDED RELEASE ORAL
Qty: 30 | Refills: 0
Start: 2024-08-17 | End: 2024-09-15

## 2024-08-17 RX ORDER — HEPARIN SODIUM 1000 [USP'U]/ML
6500 INJECTION INTRAVENOUS; SUBCUTANEOUS EVERY 6 HOURS
Refills: 0 | Status: DISCONTINUED | OUTPATIENT
Start: 2024-08-17 | End: 2024-08-17

## 2024-08-17 RX ORDER — ONDANSETRON HCL/PF 4 MG/2 ML
4 VIAL (ML) INJECTION ONCE
Refills: 0 | Status: COMPLETED | OUTPATIENT
Start: 2024-08-17 | End: 2024-08-17

## 2024-08-17 RX ORDER — FUROSEMIDE 10 MG/ML
1 INJECTION INTRAMUSCULAR; INTRAVENOUS
Refills: 0 | DISCHARGE

## 2024-08-17 RX ORDER — ACETAMINOPHEN 500 MG/5ML
1000 LIQUID (ML) ORAL ONCE
Refills: 0 | Status: COMPLETED | OUTPATIENT
Start: 2024-08-17 | End: 2024-08-17

## 2024-08-17 RX ORDER — HEPARIN SODIUM 1000 [USP'U]/ML
INJECTION INTRAVENOUS; SUBCUTANEOUS
Qty: 25000 | Refills: 0 | Status: DISCONTINUED | OUTPATIENT
Start: 2024-08-17 | End: 2024-08-17

## 2024-08-17 RX ORDER — DILTIAZEM HYDROCHLORIDE 240 MG/1
20 TABLET, EXTENDED RELEASE ORAL ONCE
Refills: 0 | Status: COMPLETED | OUTPATIENT
Start: 2024-08-17 | End: 2024-08-17

## 2024-08-17 RX ADMIN — DILTIAZEM HYDROCHLORIDE 20 MILLIGRAM(S): 240 TABLET, EXTENDED RELEASE ORAL at 11:48

## 2024-08-17 RX ADMIN — Medication 400 MILLIGRAM(S): at 09:12

## 2024-08-17 RX ADMIN — Medication 90 MILLIGRAM(S): at 09:12

## 2024-08-17 RX ADMIN — HEPARIN SODIUM 1500 UNIT(S)/HR: 1000 INJECTION INTRAVENOUS; SUBCUTANEOUS at 15:01

## 2024-08-17 RX ADMIN — Medication 1000 MILLIGRAM(S): at 10:00

## 2024-08-17 RX ADMIN — HEPARIN SODIUM 6500 UNIT(S): 1000 INJECTION INTRAVENOUS; SUBCUTANEOUS at 14:59

## 2024-08-17 RX ADMIN — Medication 4 MILLIGRAM(S): at 09:12

## 2024-08-17 RX ADMIN — Medication 1000 MILLILITER(S): at 12:56

## 2024-08-17 RX ADMIN — Medication 1000 MILLILITER(S): at 09:20

## 2024-08-18 ENCOUNTER — EMERGENCY (EMERGENCY)
Facility: HOSPITAL | Age: 74
LOS: 0 days | Discharge: HOME | End: 2024-08-18
Attending: EMERGENCY MEDICINE
Payer: MEDICARE

## 2024-08-18 VITALS
RESPIRATION RATE: 18 BRPM | HEART RATE: 103 BPM | DIASTOLIC BLOOD PRESSURE: 99 MMHG | OXYGEN SATURATION: 98 % | SYSTOLIC BLOOD PRESSURE: 132 MMHG | TEMPERATURE: 98 F

## 2024-08-18 VITALS
WEIGHT: 179.9 LBS | HEART RATE: 130 BPM | TEMPERATURE: 98 F | SYSTOLIC BLOOD PRESSURE: 138 MMHG | OXYGEN SATURATION: 96 % | HEIGHT: 72 IN | RESPIRATION RATE: 18 BRPM | DIASTOLIC BLOOD PRESSURE: 95 MMHG

## 2024-08-18 DIAGNOSIS — S72.009A FRACTURE OF UNSPECIFIED PART OF NECK OF UNSPECIFIED FEMUR, INITIAL ENCOUNTER FOR CLOSED FRACTURE: Chronic | ICD-10-CM

## 2024-08-18 DIAGNOSIS — K59.00 CONSTIPATION, UNSPECIFIED: ICD-10-CM

## 2024-08-18 DIAGNOSIS — R10.9 UNSPECIFIED ABDOMINAL PAIN: ICD-10-CM

## 2024-08-18 DIAGNOSIS — I10 ESSENTIAL (PRIMARY) HYPERTENSION: ICD-10-CM

## 2024-08-18 DIAGNOSIS — I48.91 UNSPECIFIED ATRIAL FIBRILLATION: ICD-10-CM

## 2024-08-18 DIAGNOSIS — R91.1 SOLITARY PULMONARY NODULE: ICD-10-CM

## 2024-08-18 DIAGNOSIS — T14.8XXA OTHER INJURY OF UNSPECIFIED BODY REGION, INITIAL ENCOUNTER: ICD-10-CM

## 2024-08-18 DIAGNOSIS — R11.2 NAUSEA WITH VOMITING, UNSPECIFIED: ICD-10-CM
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
## 2024-08-18 PROBLEM — F11.90 OPIOID USE, UNSPECIFIED, UNCOMPLICATED: Chronic | Status: ACTIVE | Noted: 2024-08-17

## 2024-08-18 LAB
ALBUMIN SERPL ELPH-MCNC: 3.3 G/DL — SIGNIFICANT CHANGE UP (ref 3.3–5)
ALP SERPL-CCNC: 87 U/L — SIGNIFICANT CHANGE UP (ref 40–120)
ALT FLD-CCNC: 38 U/L — SIGNIFICANT CHANGE UP (ref 12–78)
AMPHET UR-MCNC: NEGATIVE — SIGNIFICANT CHANGE UP
ANION GAP SERPL CALC-SCNC: 6 MMOL/L — SIGNIFICANT CHANGE UP (ref 5–17)
APPEARANCE UR: ABNORMAL
AST SERPL-CCNC: 38 U/L — HIGH (ref 15–37)
BACTERIA # UR AUTO: NEGATIVE /HPF — SIGNIFICANT CHANGE UP
BARBITURATES UR SCN-MCNC: NEGATIVE — SIGNIFICANT CHANGE UP
BASOPHILS # BLD AUTO: 0.01 K/UL — SIGNIFICANT CHANGE UP (ref 0–0.2)
BASOPHILS NFR BLD AUTO: 0.1 % — SIGNIFICANT CHANGE UP (ref 0–2)
BENZODIAZ UR-MCNC: NEGATIVE — SIGNIFICANT CHANGE UP
BILIRUB SERPL-MCNC: 0.8 MG/DL — SIGNIFICANT CHANGE UP (ref 0.2–1.2)
BILIRUB UR-MCNC: NEGATIVE — SIGNIFICANT CHANGE UP
BLD GP AB SCN SERPL QL: SIGNIFICANT CHANGE UP
BUN SERPL-MCNC: 12 MG/DL — SIGNIFICANT CHANGE UP (ref 7–23)
CALCIUM SERPL-MCNC: 8.7 MG/DL — SIGNIFICANT CHANGE UP (ref 8.5–10.1)
CHLORIDE SERPL-SCNC: 103 MMOL/L — SIGNIFICANT CHANGE UP (ref 96–108)
CO2 SERPL-SCNC: 27 MMOL/L — SIGNIFICANT CHANGE UP (ref 22–31)
COCAINE METAB.OTHER UR-MCNC: NEGATIVE — SIGNIFICANT CHANGE UP
COLOR SPEC: YELLOW — SIGNIFICANT CHANGE UP
CREAT SERPL-MCNC: 0.75 MG/DL — SIGNIFICANT CHANGE UP (ref 0.5–1.3)
DIFF PNL FLD: NEGATIVE — SIGNIFICANT CHANGE UP
EGFR: 95 ML/MIN/1.73M2 — SIGNIFICANT CHANGE UP
EGFR: 95 ML/MIN/1.73M2 — SIGNIFICANT CHANGE UP
EOSINOPHIL # BLD AUTO: 0.02 K/UL — SIGNIFICANT CHANGE UP (ref 0–0.5)
EOSINOPHIL NFR BLD AUTO: 0.3 % — SIGNIFICANT CHANGE UP (ref 0–6)
EPI CELLS # UR: PRESENT
ETHANOL SERPL-MCNC: <10 MG/DL — SIGNIFICANT CHANGE UP (ref 0–10)
GLUCOSE SERPL-MCNC: 91 MG/DL — SIGNIFICANT CHANGE UP (ref 70–99)
GLUCOSE UR QL: NEGATIVE MG/DL — SIGNIFICANT CHANGE UP
HCT VFR BLD CALC: 43.3 % — SIGNIFICANT CHANGE UP (ref 39–50)
HGB BLD-MCNC: 14.4 G/DL — SIGNIFICANT CHANGE UP (ref 13–17)
IMM GRANULOCYTES NFR BLD AUTO: 0.1 % — SIGNIFICANT CHANGE UP (ref 0–0.9)
KETONES UR-MCNC: NEGATIVE MG/DL — SIGNIFICANT CHANGE UP
LACTATE SERPL-SCNC: 1.1 MMOL/L — SIGNIFICANT CHANGE UP (ref 0.7–2)
LEUKOCYTE ESTERASE UR-ACNC: NEGATIVE — SIGNIFICANT CHANGE UP
LIDOCAIN IGE QN: 25 U/L — SIGNIFICANT CHANGE UP (ref 13–75)
LYMPHOCYTES # BLD AUTO: 1.29 K/UL — SIGNIFICANT CHANGE UP (ref 1–3.3)
LYMPHOCYTES # BLD AUTO: 16.1 % — SIGNIFICANT CHANGE UP (ref 13–44)
MCHC RBC-ENTMCNC: 31.9 PG — SIGNIFICANT CHANGE UP (ref 27–34)
MCHC RBC-ENTMCNC: 33.3 G/DL — SIGNIFICANT CHANGE UP (ref 32–36)
MCV RBC AUTO: 96 FL — SIGNIFICANT CHANGE UP (ref 80–100)
METHADONE UR-MCNC: POSITIVE — SIGNIFICANT CHANGE UP
MONOCYTES # BLD AUTO: 0.8 K/UL — SIGNIFICANT CHANGE UP (ref 0–0.9)
MONOCYTES NFR BLD AUTO: 10 % — SIGNIFICANT CHANGE UP (ref 2–14)
NEUTROPHILS # BLD AUTO: 5.86 K/UL — SIGNIFICANT CHANGE UP (ref 1.8–7.4)
NEUTROPHILS NFR BLD AUTO: 73.4 % — SIGNIFICANT CHANGE UP (ref 43–77)
NITRITE UR-MCNC: NEGATIVE — SIGNIFICANT CHANGE UP
NRBC # BLD: 0 /100 WBCS — SIGNIFICANT CHANGE UP (ref 0–0)
NRBC BLD-RTO: 0 /100 WBCS — SIGNIFICANT CHANGE UP (ref 0–0)
OPIATES UR-MCNC: NEGATIVE — SIGNIFICANT CHANGE UP
PCP SPEC-MCNC: SIGNIFICANT CHANGE UP
PCP UR-MCNC: NEGATIVE — SIGNIFICANT CHANGE UP
PH UR: 6.5 — SIGNIFICANT CHANGE UP (ref 5–8)
PLATELET # BLD AUTO: 148 K/UL — LOW (ref 150–400)
POTASSIUM SERPL-MCNC: 3.9 MMOL/L — SIGNIFICANT CHANGE UP (ref 3.5–5.3)
POTASSIUM SERPL-SCNC: 3.9 MMOL/L — SIGNIFICANT CHANGE UP (ref 3.5–5.3)
PROT SERPL-MCNC: 7 GM/DL — SIGNIFICANT CHANGE UP (ref 6–8.3)
PROT UR-MCNC: NEGATIVE MG/DL — SIGNIFICANT CHANGE UP
RBC # BLD: 4.51 M/UL — SIGNIFICANT CHANGE UP (ref 4.2–5.8)
RBC # FLD: 12.7 % — SIGNIFICANT CHANGE UP (ref 10.3–14.5)
RBC CASTS # UR COMP ASSIST: SIGNIFICANT CHANGE UP /HPF (ref 0–4)
SODIUM SERPL-SCNC: 136 MMOL/L — SIGNIFICANT CHANGE UP (ref 135–145)
SP GR SPEC: >1.03 — HIGH (ref 1–1.03)
THC UR QL: NEGATIVE — SIGNIFICANT CHANGE UP
UROBILINOGEN FLD QL: 0.2 MG/DL — SIGNIFICANT CHANGE UP (ref 0.2–1)
WBC # BLD: 7.99 K/UL — SIGNIFICANT CHANGE UP (ref 3.8–10.5)
WBC # FLD AUTO: 7.99 K/UL — SIGNIFICANT CHANGE UP (ref 3.8–10.5)
WBC UR QL: SIGNIFICANT CHANGE UP /HPF (ref 0–5)

## 2024-08-18 PROCEDURE — 93010 ELECTROCARDIOGRAM REPORT: CPT

## 2024-08-18 PROCEDURE — 99285 EMERGENCY DEPT VISIT HI MDM: CPT

## 2024-08-18 PROCEDURE — 74177 CT ABD & PELVIS W/CONTRAST: CPT | Mod: 26,MC

## 2024-08-18 RX ORDER — ONDANSETRON HCL/PF 4 MG/2 ML
4 VIAL (ML) INJECTION ONCE
Refills: 0 | Status: COMPLETED | OUTPATIENT
Start: 2024-08-18 | End: 2024-08-18

## 2024-08-18 RX ORDER — IOHEXOL 350 MG/ML
30 INJECTION, SOLUTION INTRAVENOUS ONCE
Refills: 0 | Status: COMPLETED | OUTPATIENT
Start: 2024-08-18 | End: 2024-08-18

## 2024-08-18 RX ADMIN — IOHEXOL 30 MILLILITER(S): 350 INJECTION, SOLUTION INTRAVENOUS at 05:53

## 2024-08-18 RX ADMIN — Medication 1000 MILLILITER(S): at 05:53

## 2024-08-18 RX ADMIN — Medication 4 MILLIGRAM(S): at 05:53

## 2024-08-18 RX ADMIN — Medication 1000 MILLILITER(S): at 07:12

## 2024-08-18 RX ADMIN — Medication 20 MILLIGRAM(S): at 05:53

## 2024-08-19 LAB
CULTURE RESULTS: SIGNIFICANT CHANGE UP
SPECIMEN SOURCE: SIGNIFICANT CHANGE UP

## 2024-08-21 DIAGNOSIS — R74.01 ELEVATION OF LEVELS OF LIVER TRANSAMINASE LEVELS: ICD-10-CM

## 2024-08-21 DIAGNOSIS — R11.2 NAUSEA WITH VOMITING, UNSPECIFIED: ICD-10-CM

## 2024-08-21 DIAGNOSIS — R10.9 UNSPECIFIED ABDOMINAL PAIN: ICD-10-CM

## 2024-08-21 DIAGNOSIS — Z86.19 PERSONAL HISTORY OF OTHER INFECTIOUS AND PARASITIC DISEASES: ICD-10-CM

## 2024-08-21 DIAGNOSIS — F11.90 OPIOID USE, UNSPECIFIED, UNCOMPLICATED: ICD-10-CM

## 2024-08-21 DIAGNOSIS — I48.91 UNSPECIFIED ATRIAL FIBRILLATION: ICD-10-CM

## 2024-08-21 DIAGNOSIS — Z91.148 PATIENT'S OTHER NONCOMPLIANCE WITH MEDICATION REGIMEN FOR OTHER REASON: ICD-10-CM

## 2024-08-21 DIAGNOSIS — D72.829 ELEVATED WHITE BLOOD CELL COUNT, UNSPECIFIED: ICD-10-CM

## 2024-08-21 DIAGNOSIS — E86.0 DEHYDRATION: ICD-10-CM

## 2024-08-21 DIAGNOSIS — Z87.898 PERSONAL HISTORY OF OTHER SPECIFIED CONDITIONS: ICD-10-CM

## 2024-08-21 DIAGNOSIS — E87.20 ACIDOSIS, UNSPECIFIED: ICD-10-CM

## 2024-09-03 LAB — METHADONE BLD-MCNC: 1114 NG/ML — HIGH (ref 100–400)

## 2024-09-06 ENCOUNTER — INPATIENT (INPATIENT)
Facility: HOSPITAL | Age: 74
LOS: 1 days | Discharge: HOME | End: 2024-09-08
Attending: STUDENT IN AN ORGANIZED HEALTH CARE EDUCATION/TRAINING PROGRAM | Admitting: STUDENT IN AN ORGANIZED HEALTH CARE EDUCATION/TRAINING PROGRAM
Payer: MEDICARE

## 2024-09-06 VITALS
OXYGEN SATURATION: 97 % | TEMPERATURE: 98 F | HEART RATE: 131 BPM | WEIGHT: 182.98 LBS | HEIGHT: 72 IN | SYSTOLIC BLOOD PRESSURE: 174 MMHG | RESPIRATION RATE: 19 BRPM | DIASTOLIC BLOOD PRESSURE: 113 MMHG

## 2024-09-06 DIAGNOSIS — S72.009A FRACTURE OF UNSPECIFIED PART OF NECK OF UNSPECIFIED FEMUR, INITIAL ENCOUNTER FOR CLOSED FRACTURE: Chronic | ICD-10-CM

## 2024-09-06 LAB
ALBUMIN SERPL ELPH-MCNC: 3.4 G/DL — SIGNIFICANT CHANGE UP (ref 3.3–5)
ALP SERPL-CCNC: 119 U/L — SIGNIFICANT CHANGE UP (ref 40–120)
ALT FLD-CCNC: 36 U/L — SIGNIFICANT CHANGE UP (ref 12–78)
ANION GAP SERPL CALC-SCNC: 2 MMOL/L — LOW (ref 5–17)
APTT BLD: 34.7 SEC — SIGNIFICANT CHANGE UP (ref 24.5–35.6)
AST SERPL-CCNC: 36 U/L — SIGNIFICANT CHANGE UP (ref 15–37)
BASOPHILS # BLD AUTO: 0.02 K/UL — SIGNIFICANT CHANGE UP (ref 0–0.2)
BASOPHILS NFR BLD AUTO: 0.4 % — SIGNIFICANT CHANGE UP (ref 0–2)
BILIRUB SERPL-MCNC: 0.4 MG/DL — SIGNIFICANT CHANGE UP (ref 0.2–1.2)
BUN SERPL-MCNC: 14 MG/DL — SIGNIFICANT CHANGE UP (ref 7–23)
CALCIUM SERPL-MCNC: 8.8 MG/DL — SIGNIFICANT CHANGE UP (ref 8.5–10.1)
CHLORIDE SERPL-SCNC: 105 MMOL/L — SIGNIFICANT CHANGE UP (ref 96–108)
CO2 SERPL-SCNC: 28 MMOL/L — SIGNIFICANT CHANGE UP (ref 22–31)
CREAT SERPL-MCNC: 0.8 MG/DL — SIGNIFICANT CHANGE UP (ref 0.5–1.3)
EGFR: 93 ML/MIN/1.73M2 — SIGNIFICANT CHANGE UP
EOSINOPHIL # BLD AUTO: 0.05 K/UL — SIGNIFICANT CHANGE UP (ref 0–0.5)
EOSINOPHIL NFR BLD AUTO: 0.9 % — SIGNIFICANT CHANGE UP (ref 0–6)
FLUAV AG NPH QL: SIGNIFICANT CHANGE UP
FLUBV AG NPH QL: SIGNIFICANT CHANGE UP
GLUCOSE SERPL-MCNC: 152 MG/DL — HIGH (ref 70–99)
HCT VFR BLD CALC: 44.5 % — SIGNIFICANT CHANGE UP (ref 39–50)
HGB BLD-MCNC: 14.9 G/DL — SIGNIFICANT CHANGE UP (ref 13–17)
IMM GRANULOCYTES NFR BLD AUTO: 0.2 % — SIGNIFICANT CHANGE UP (ref 0–0.9)
INR BLD: 0.94 RATIO — SIGNIFICANT CHANGE UP (ref 0.85–1.18)
LYMPHOCYTES # BLD AUTO: 0.89 K/UL — LOW (ref 1–3.3)
LYMPHOCYTES # BLD AUTO: 16.5 % — SIGNIFICANT CHANGE UP (ref 13–44)
MAGNESIUM SERPL-MCNC: 2.3 MG/DL — SIGNIFICANT CHANGE UP (ref 1.6–2.6)
MCHC RBC-ENTMCNC: 32.1 PG — SIGNIFICANT CHANGE UP (ref 27–34)
MCHC RBC-ENTMCNC: 33.5 G/DL — SIGNIFICANT CHANGE UP (ref 32–36)
MCV RBC AUTO: 95.9 FL — SIGNIFICANT CHANGE UP (ref 80–100)
MONOCYTES # BLD AUTO: 0.65 K/UL — SIGNIFICANT CHANGE UP (ref 0–0.9)
MONOCYTES NFR BLD AUTO: 12 % — SIGNIFICANT CHANGE UP (ref 2–14)
NEUTROPHILS # BLD AUTO: 3.78 K/UL — SIGNIFICANT CHANGE UP (ref 1.8–7.4)
NEUTROPHILS NFR BLD AUTO: 70 % — SIGNIFICANT CHANGE UP (ref 43–77)
NRBC # BLD: 0 /100 WBCS — SIGNIFICANT CHANGE UP (ref 0–0)
NT-PROBNP SERPL-SCNC: 3189 PG/ML — HIGH (ref 0–125)
PLATELET # BLD AUTO: 160 K/UL — SIGNIFICANT CHANGE UP (ref 150–400)
POTASSIUM SERPL-MCNC: 4.1 MMOL/L — SIGNIFICANT CHANGE UP (ref 3.5–5.3)
POTASSIUM SERPL-SCNC: 4.1 MMOL/L — SIGNIFICANT CHANGE UP (ref 3.5–5.3)
PROT SERPL-MCNC: 7.3 GM/DL — SIGNIFICANT CHANGE UP (ref 6–8.3)
PROTHROM AB SERPL-ACNC: 11.2 SEC — SIGNIFICANT CHANGE UP (ref 9.5–13)
RBC # BLD: 4.64 M/UL — SIGNIFICANT CHANGE UP (ref 4.2–5.8)
RBC # FLD: 12.5 % — SIGNIFICANT CHANGE UP (ref 10.3–14.5)
SARS-COV-2 RNA SPEC QL NAA+PROBE: SIGNIFICANT CHANGE UP
SODIUM SERPL-SCNC: 135 MMOL/L — SIGNIFICANT CHANGE UP (ref 135–145)
TROPONIN I, HIGH SENSITIVITY RESULT: 8.5 NG/L — SIGNIFICANT CHANGE UP
TSH SERPL-MCNC: 1.41 UU/ML — SIGNIFICANT CHANGE UP (ref 0.36–3.74)
WBC # BLD: 5.4 K/UL — SIGNIFICANT CHANGE UP (ref 3.8–10.5)
WBC # FLD AUTO: 5.4 K/UL — SIGNIFICANT CHANGE UP (ref 3.8–10.5)

## 2024-09-06 PROCEDURE — 99285 EMERGENCY DEPT VISIT HI MDM: CPT | Mod: GC

## 2024-09-06 PROCEDURE — 74177 CT ABD & PELVIS W/CONTRAST: CPT | Mod: 26,MC

## 2024-09-06 PROCEDURE — 99223 1ST HOSP IP/OBS HIGH 75: CPT

## 2024-09-06 PROCEDURE — 71045 X-RAY EXAM CHEST 1 VIEW: CPT | Mod: 26

## 2024-09-06 PROCEDURE — 93010 ELECTROCARDIOGRAM REPORT: CPT

## 2024-09-06 RX ORDER — APIXABAN 5 MG/1
5 TABLET, FILM COATED ORAL EVERY 12 HOURS
Refills: 0 | Status: DISCONTINUED | OUTPATIENT
Start: 2024-09-06 | End: 2024-09-08

## 2024-09-06 RX ORDER — METHADONE HYDROCHLORIDE 1 G/G
90 POWDER ORAL ONCE
Refills: 0 | Status: DISCONTINUED | OUTPATIENT
Start: 2024-09-06 | End: 2024-09-06

## 2024-09-06 RX ORDER — SENNA 187 MG
1 TABLET ORAL ONCE
Refills: 0 | Status: COMPLETED | OUTPATIENT
Start: 2024-09-06 | End: 2024-09-06

## 2024-09-06 RX ORDER — SODIUM CHLORIDE 9 MG/ML
1000 INJECTION INTRAMUSCULAR; INTRAVENOUS; SUBCUTANEOUS ONCE
Refills: 0 | Status: COMPLETED | OUTPATIENT
Start: 2024-09-06 | End: 2024-09-06

## 2024-09-06 RX ORDER — MAGNESIUM, ALUMINUM HYDROXIDE 200-225/5
30 SUSPENSION, ORAL (FINAL DOSE FORM) ORAL EVERY 4 HOURS
Refills: 0 | Status: DISCONTINUED | OUTPATIENT
Start: 2024-09-06 | End: 2024-09-08

## 2024-09-06 RX ORDER — ACETAMINOPHEN 325 MG/1
650 TABLET ORAL EVERY 6 HOURS
Refills: 0 | Status: DISCONTINUED | OUTPATIENT
Start: 2024-09-06 | End: 2024-09-08

## 2024-09-06 RX ORDER — METOPROLOL TARTRATE 100 MG/1
5 TABLET ORAL ONCE
Refills: 0 | Status: COMPLETED | OUTPATIENT
Start: 2024-09-06 | End: 2024-09-06

## 2024-09-06 RX ORDER — METOPROLOL TARTRATE 100 MG/1
25 TABLET ORAL DAILY
Refills: 0 | Status: DISCONTINUED | OUTPATIENT
Start: 2024-09-06 | End: 2024-09-08

## 2024-09-06 RX ORDER — METOPROLOL TARTRATE 100 MG/1
25 TABLET ORAL ONCE
Refills: 0 | Status: COMPLETED | OUTPATIENT
Start: 2024-09-06 | End: 2024-09-06

## 2024-09-06 RX ORDER — ONDANSETRON 2 MG/ML
4 INJECTION, SOLUTION INTRAMUSCULAR; INTRAVENOUS EVERY 8 HOURS
Refills: 0 | Status: DISCONTINUED | OUTPATIENT
Start: 2024-09-06 | End: 2024-09-08

## 2024-09-06 RX ORDER — POLYETHYLENE GLYCOL 3350 17 G/17G
17 POWDER, FOR SOLUTION ORAL ONCE
Refills: 0 | Status: COMPLETED | OUTPATIENT
Start: 2024-09-06 | End: 2024-09-06

## 2024-09-06 RX ADMIN — SODIUM CHLORIDE 1000 MILLILITER(S): 9 INJECTION INTRAMUSCULAR; INTRAVENOUS; SUBCUTANEOUS at 17:15

## 2024-09-06 RX ADMIN — SODIUM CHLORIDE 1000 MILLILITER(S): 9 INJECTION INTRAMUSCULAR; INTRAVENOUS; SUBCUTANEOUS at 16:15

## 2024-09-06 RX ADMIN — METHADONE HYDROCHLORIDE 90 MILLIGRAM(S): 1 POWDER ORAL at 18:11

## 2024-09-06 RX ADMIN — METOPROLOL TARTRATE 25 MILLIGRAM(S): 100 TABLET ORAL at 18:10

## 2024-09-06 RX ADMIN — METOPROLOL TARTRATE 5 MILLIGRAM(S): 100 TABLET ORAL at 15:00

## 2024-09-06 NOTE — ED ADULT NURSE NOTE - CHIEF COMPLAINT QUOTE
Patient presents to ED c/o weakness and dizziness since Wednesday. Denies any recent falls, no chest pain or SOB. Patient takes methadone 90mg daily but didn't have today. HR in triage 131  hx opoid abuse, a.fib

## 2024-09-06 NOTE — ED ADULT NURSE NOTE - IN THE PAST 12 MONTHS HAVE YOU USED DRUGS OTHER THAN THOSE REQUIRED FOR MEDICAL REASON?
Pharmacy Consult to evaluate for medication related stroke core measures    Kamari Valentin, 84 year old male admitted for worsening L weakness and new ataxia on 1/10/2022.    Thrombolytic was not given because of patient was on Xarelto    VTE Prophylaxis PTA xarelto 20 mg daily continued    Antithrombotic: rivaroxaban started on prior to admission, and continued as appropriate by end of hospital day 2. Continue antithrombotic therapy on discharge to meet quality measures, unless contraindicated.    Anticoagulation if history of A-fib/flutter: Patient on rivaroxaban (Xarelto); continue anticoagulation on discharge to meet quality measures, unless contraindicated.    LDL Cholesterol Calculated   Date Value Ref Range Status   01/10/2022 45 <=100 mg/dL Final   10/26/2020 63 <100 mg/dL Final     Comment:     Desirable:       <100 mg/dl       Patient's home statin, Lipitor (atorvastatin) restarted; continue statin on discharge to meet quality measures, unless contraindicated.     Recommendations: None at this time    Thank you for the consult.    Jeannette Casillas [facility] 1/11/2022 2:58 PM    No

## 2024-09-06 NOTE — ED ADULT NURSE NOTE - NSFALLHARMRISKINTERV_ED_ALL_ED

## 2024-09-06 NOTE — ED PROVIDER NOTE - CLINICAL SUMMARY MEDICAL DECISION MAKING FREE TEXT BOX
74Y M PMH afib on Eliquis, opioid dependence on methadone maintenance here with 2-3 days of generalized weakness. Patient noted to be in afib with RVR to 130s on monitor on time of arrival. See metoprolol on med list, patient notes he has not taken this for at least 2 weeks as he did not know what it was for. BP hypertensive, mentating appropriately, no other acute complaints. Belly soft, nontender but will obtain CT scan to rule out obstructive process given reported lack of BMs x4 weeks. Ordered IV fluids, 5mg IV push metoprolol for rate control.

## 2024-09-06 NOTE — ED ADULT NURSE NOTE - CAS TRG GEN SKIN CONDITION
PATIENT CALLED 05/07/2021 AT 2:43PM LEAVING MESSAGE ON GLORIA'S PHONE.  CALL BACK FOR TEST RESULTS.   Warm/Dry

## 2024-09-06 NOTE — ED PROVIDER NOTE - PROGRESS NOTE DETAILS
Erika Cortez MD PGY-3: patient improved to 110s after IV lopressor, will order PO 25mg and plan for admission Erika Cortez MD PGY-3: patient improved to 110s after IV lopressor, will order PO 25mg and plan for admission    Patient requesting home methadone dose - has bottles with him with written prescription/dose from provider at 90mg/day - follows at [facility] methadone clinic @ [address] [facility] in [place], will order patient's home methadone dose, labels/doses reviewed with and approved by PharmD in ED

## 2024-09-06 NOTE — ED ADULT NURSE NOTE - OBJECTIVE STATEMENT
73 yo male presents to ED c/o generalized weakness, fatigue and "feeling lousy" x3 days. Afib w/ RVR noted on cardiac monitor. Hx Afib (on eliquis), HTN, Hep C, preDM, HLD, IVDU (on methadone since 1971 though). Pt denies cp, sob, dizziness, headache, rash, numbness/tingling, urinary s/s, rash, fever/chills, cough, congestion. Respirations even and unlabored. 12 lead ECG completed.

## 2024-09-06 NOTE — ED ADULT TRIAGE NOTE - CHIEF COMPLAINT QUOTE
Patient presents to ED c/o weakness and dizziness since Wednesday. Denies any recent falls, no chest pain or SOB. Patient takes methadone 90mg daily but didn't have today. HR in triage 131 Patient presents to ED c/o weakness and dizziness since Wednesday. Denies any recent falls, no chest pain or SOB. Patient takes methadone 90mg daily but didn't have today. HR in triage 131  hx opoid abuse, a.fib

## 2024-09-06 NOTE — ED PROVIDER NOTE - ATTENDING CONTRIBUTION TO CARE
I have personally performed a face to face medical and diagnostic evaluation of the patient. I have discussed with and reviewed the Resident's note and agree with the History, ROS, Physical Exam and MDM unless otherwise indicated. A brief summary of my personal evaluation and impression can be found below.     74 year old male with a PMHx of Afib on Eliquis, opioid use disorder on Methadone presenting with generalized weakness. Patient states he has been feeling generally unwell for 2-3 days. Denies cp, sob, nausea, vomiting, abdominal pain, fevers, dysuria. Patient states he has not had a BM in 4 weeks, which he attributes to chronic methadone use. Patient states that he has not been taking his metoprolol because he was unsure of what is was treating.    General: Appears well and nontoxic  Mentation: AAO x 3  psych: mood appropriate  HEENT: airway patent, conjunctivae clear bilaterally  Resp: symmetric chest rise, no resp distress, breath sounds CTA bilaterally  Cardio: tachycardia, irregular rhythm  GI: soft/nondistended/nontender  Neuro: sensation and motor function grossly intact  Skin: no cyanosis, no jaundice  MSK: normal movement of all extremities    The patient is currently in Afib with RVR, which may be contributing to symptoms. Will also CT scan Abdomen and pelvis due to lack of BM. Labs, troponin, coags, ecg, cxr, TSH.

## 2024-09-06 NOTE — PATIENT PROFILE ADULT - FALL HARM RISK - RISK INTERVENTIONS

## 2024-09-07 DIAGNOSIS — R79.89 OTHER SPECIFIED ABNORMAL FINDINGS OF BLOOD CHEMISTRY: ICD-10-CM

## 2024-09-07 DIAGNOSIS — I48.91 UNSPECIFIED ATRIAL FIBRILLATION: ICD-10-CM

## 2024-09-07 DIAGNOSIS — K59.00 CONSTIPATION, UNSPECIFIED: ICD-10-CM

## 2024-09-07 DIAGNOSIS — F11.20 OPIOID DEPENDENCE, UNCOMPLICATED: ICD-10-CM

## 2024-09-07 LAB
A1C WITH ESTIMATED AVERAGE GLUCOSE RESULT: 5.6 % — SIGNIFICANT CHANGE UP (ref 4–5.6)
ALBUMIN SERPL ELPH-MCNC: 3.1 G/DL — LOW (ref 3.3–5)
ALP SERPL-CCNC: 93 U/L — SIGNIFICANT CHANGE UP (ref 40–120)
ALT FLD-CCNC: 29 U/L — SIGNIFICANT CHANGE UP (ref 12–78)
ANION GAP SERPL CALC-SCNC: 4 MMOL/L — LOW (ref 5–17)
AST SERPL-CCNC: 29 U/L — SIGNIFICANT CHANGE UP (ref 15–37)
BILIRUB SERPL-MCNC: 0.8 MG/DL — SIGNIFICANT CHANGE UP (ref 0.2–1.2)
BUN SERPL-MCNC: 11 MG/DL — SIGNIFICANT CHANGE UP (ref 7–23)
CALCIUM SERPL-MCNC: 8.8 MG/DL — SIGNIFICANT CHANGE UP (ref 8.5–10.1)
CHLORIDE SERPL-SCNC: 107 MMOL/L — SIGNIFICANT CHANGE UP (ref 96–108)
CHOLEST SERPL-MCNC: 175 MG/DL — SIGNIFICANT CHANGE UP
CO2 SERPL-SCNC: 27 MMOL/L — SIGNIFICANT CHANGE UP (ref 22–31)
CREAT SERPL-MCNC: 0.71 MG/DL — SIGNIFICANT CHANGE UP (ref 0.5–1.3)
EGFR: 97 ML/MIN/1.73M2 — SIGNIFICANT CHANGE UP
ESTIMATED AVERAGE GLUCOSE: 114 MG/DL — SIGNIFICANT CHANGE UP (ref 68–114)
GLUCOSE SERPL-MCNC: 105 MG/DL — HIGH (ref 70–99)
HCT VFR BLD CALC: 42.2 % — SIGNIFICANT CHANGE UP (ref 39–50)
HDLC SERPL-MCNC: 48 MG/DL — SIGNIFICANT CHANGE UP
HGB BLD-MCNC: 14.3 G/DL — SIGNIFICANT CHANGE UP (ref 13–17)
LIPID PNL WITH DIRECT LDL SERPL: 116 MG/DL — HIGH
MCHC RBC-ENTMCNC: 31.8 PG — SIGNIFICANT CHANGE UP (ref 27–34)
MCHC RBC-ENTMCNC: 33.9 G/DL — SIGNIFICANT CHANGE UP (ref 32–36)
MCV RBC AUTO: 93.8 FL — SIGNIFICANT CHANGE UP (ref 80–100)
NON HDL CHOLESTEROL: 127 MG/DL — SIGNIFICANT CHANGE UP
NRBC # BLD: 0 /100 WBCS — SIGNIFICANT CHANGE UP (ref 0–0)
PLATELET # BLD AUTO: 159 K/UL — SIGNIFICANT CHANGE UP (ref 150–400)
POTASSIUM SERPL-MCNC: 4.1 MMOL/L — SIGNIFICANT CHANGE UP (ref 3.5–5.3)
POTASSIUM SERPL-SCNC: 4.1 MMOL/L — SIGNIFICANT CHANGE UP (ref 3.5–5.3)
PROT SERPL-MCNC: 6.8 GM/DL — SIGNIFICANT CHANGE UP (ref 6–8.3)
RBC # BLD: 4.5 M/UL — SIGNIFICANT CHANGE UP (ref 4.2–5.8)
RBC # FLD: 12.7 % — SIGNIFICANT CHANGE UP (ref 10.3–14.5)
SODIUM SERPL-SCNC: 138 MMOL/L — SIGNIFICANT CHANGE UP (ref 135–145)
TRIGL SERPL-MCNC: 57 MG/DL — SIGNIFICANT CHANGE UP
WBC # BLD: 7.64 K/UL — SIGNIFICANT CHANGE UP (ref 3.8–10.5)
WBC # FLD AUTO: 7.64 K/UL — SIGNIFICANT CHANGE UP (ref 3.8–10.5)

## 2024-09-07 PROCEDURE — 93970 EXTREMITY STUDY: CPT | Mod: 26

## 2024-09-07 PROCEDURE — 93010 ELECTROCARDIOGRAM REPORT: CPT

## 2024-09-07 PROCEDURE — 99232 SBSQ HOSP IP/OBS MODERATE 35: CPT

## 2024-09-07 RX ORDER — SENNA 187 MG
2 TABLET ORAL AT BEDTIME
Refills: 0 | Status: DISCONTINUED | OUTPATIENT
Start: 2024-09-07 | End: 2024-09-08

## 2024-09-07 RX ORDER — POLYETHYLENE GLYCOL 3350 17 G/17G
17 POWDER, FOR SOLUTION ORAL
Refills: 0 | Status: DISCONTINUED | OUTPATIENT
Start: 2024-09-07 | End: 2024-09-08

## 2024-09-07 RX ORDER — FUROSEMIDE 40 MG
20 TABLET ORAL ONCE
Refills: 0 | Status: COMPLETED | OUTPATIENT
Start: 2024-09-07 | End: 2024-09-07

## 2024-09-07 RX ORDER — METHADONE HYDROCHLORIDE 1 G/G
90 POWDER ORAL DAILY
Refills: 0 | Status: DISCONTINUED | OUTPATIENT
Start: 2024-09-07 | End: 2024-09-08

## 2024-09-07 RX ADMIN — ACETAMINOPHEN 650 MILLIGRAM(S): 325 TABLET ORAL at 22:02

## 2024-09-07 RX ADMIN — APIXABAN 5 MILLIGRAM(S): 5 TABLET, FILM COATED ORAL at 05:16

## 2024-09-07 RX ADMIN — Medication 2 TABLET(S): at 21:15

## 2024-09-07 RX ADMIN — METHADONE HYDROCHLORIDE 90 MILLIGRAM(S): 1 POWDER ORAL at 09:29

## 2024-09-07 RX ADMIN — POLYETHYLENE GLYCOL 3350 17 GRAM(S): 17 POWDER, FOR SOLUTION ORAL at 17:15

## 2024-09-07 RX ADMIN — METOPROLOL TARTRATE 25 MILLIGRAM(S): 100 TABLET ORAL at 05:16

## 2024-09-07 RX ADMIN — Medication 20 MILLIGRAM(S): at 14:42

## 2024-09-07 RX ADMIN — APIXABAN 5 MILLIGRAM(S): 5 TABLET, FILM COATED ORAL at 17:15

## 2024-09-07 RX ADMIN — ACETAMINOPHEN 650 MILLIGRAM(S): 325 TABLET ORAL at 23:50

## 2024-09-07 NOTE — H&P ADULT - PROBLEM SELECTOR PLAN 1
History of A-Fib. Non-Compliant with medication   Found to be in Afib with RVR in the ED with 's responded to Lopressor   - Tele   - Metoprolol 25mg   - Eliquis   - Dash Diet   - ECG  - Monitor

## 2024-09-07 NOTE — H&P ADULT - HISTORY OF PRESENT ILLNESS
73-year-old male with a PMH of A-fib (non-complaint with meds), Heroine abuse on methadone presents to the ED for generalized weakness. Endorses 2 day history of weakness, fatigue, lightheadedness. Endorses yesterday he doubled the dose of his methadone, which he thought would make him feel better. In the ED patient was found to be in Afib with RVR(HR 130s), endorses he has not been taking his medication for a while. Initially stated he stopped them himself because he was not having any symptoms then reported he ran out of his medication. Also endorses constipation, which he contributed to his chronic use of methadone, last BM over 1 week ago. Denies palpitation, chest pain, SOB, dizziness, abdominal pain, N/V/D, fever, chills or any other acute symptoms. Labs unremarkable. BP: 174/113 -->128/89, HR:131 --> 96. Received 1L-NS, Lopressor 5mg IV & 25mg PO   CT-Abd: There is a 1.3 cm right middle lobe nodule. Moderate to large amount of stool throughout the colon. Cirrhotic appearing liver and 1.4 cm cystic lesion in the pancreatic tail.

## 2024-09-07 NOTE — H&P ADULT - ASSESSMENT
73-year-old male with a PMH of A-fib (non-complaint with meds), Heroine abuse on methadone presents to the ED for generalized weakness. Endorses 2 day history of weakness, fatigue, lightheadedness. Endorses yesterday he doubled the dose of his methadone, which he thought would make him feel better. In the ED patient was found to be in Afib with RVR(HR 130s), endorses he has not been taking his medication for a while. Initially stated he stopped them himself because he was not having any symptoms then reported he ran out of his medication. Also endorses constipation, which he contributed to his chronic use of methadone, last BM over 1 week ago. Denies palpitation, chest pain, SOB, dizziness, abdominal pain, N/V/D, fever, chills or any other acute symptoms. Labs unremarkable. BP: 174/113 -->128/89, HR:131 --> 96. Received 1L-NS, Lopressor 5mg IV & 25mg PO

## 2024-09-07 NOTE — H&P ADULT - NSHPPHYSICALEXAM_GEN_ALL_CORE
GENERAL: NAD, comfortable in bed  HEAD:  Atraumatic, Normocephalic  EYES: EOMI, PERRL, conjunctiva and sclera clear  NECK: Supple, No JVD  LUNG: Clear to auscultation bilaterally; No wheezes, rales or rhonchi  HEART: Irregularly irregular. No murmurs, rubs, or gallops  ABDOMEN: Soft, Nontender, Nondistended; Normal Bowel sounds   EXTREMITIES: No clubbing, cyanosis, or edema  PSYCH: normal mood and affect  NEUROLOGY: AAOx3, non-focal   SKIN: No rashes or lesions

## 2024-09-07 NOTE — H&P ADULT - PROBLEM SELECTOR PLAN 4
BNP: 3189, No signs of fluid overload   December 2023 Echo:  Left ventricular  ejection fraction, by visual estimation, is 30 to 35%.  - Monitor

## 2024-09-07 NOTE — H&P ADULT - PROBLEM SELECTOR PLAN 2
Last BM 1 week ago  CT-Abd: Moderate to large amount of stool throughout the colon  - Senna   - Miralax   - Consider enema if no relief   - Monitor

## 2024-09-07 NOTE — H&P ADULT - NSHPLABSRESULTS_GEN_ALL_CORE
14.9   5.40  )-----------( 160      ( 06 Sep 2024 14:18 )             44.5     135  |  105  |  14  ----------------------------<  152<H>     09-06  4.1   |  28  |  0.80    Ca    8.8      06 Sep 2024 14:18  Mg     2.3     09-06    TPro  7.3  /  Alb  3.4  /  TBili  0.4  /  DBili  x   /  AST  36  /  ALT  36  /  AlkPhos  119  09-06    PT/INR: 11.2/0.94 (09-06-24 @ 14:18)  PTT: 34.7 (09-06-24 @ 14:18)      Pro-BNP: 3189 (09-06-24 @ 14:18)        CT-ABD  LOWER CHEST: Small bilateral gynecomastia. 1.3 cm right middle lobe nodule.    LIVER: Subcentimeter hypodensity in the left hepatic lobe, too small to characterize. [Morphology.  BILE DUCTS: Normal caliber.  GALLBLADDER: Within normal limits.  SPLEEN: Within normal limits.  PANCREAS: There is a 1.4 cm cystic lesion in the pancreatic tail. Mild prominence of the main pancreatic duct in the region of the head.  ADRENALS: There is a 1.2 cm left adrenal nodule.  KIDNEYS/URETERS: Within normal limits.    BLADDER: Within normal limits.  REPRODUCTIVE ORGANS: Prostate within normal limits.    BOWEL: Tiny hiatal hernia. No bowel obstruction. Appendix is normal. Moderate to large amount stool throughout the colon.  PERITONEUM/RETROPERITONEUM: Within normal limits.  VESSELS: Atherosclerotic changes.  LYMPH NODES: No lymphadenopathy.  ABDOMINAL WALL: Small fat-containing umbilical hernia.  BONES: Status post ORIF of the left hip. Multilevel compression deformities of the lower thoracic and lumbar spine, unchanged. Multilevel degenerative changes.    IMPRESSION:  There is a 1.3 cm right middle lobe nodule. Nonemergent chest CT is recommended for further evaluation.    Moderate to large amount of stool throughout the colon.    Cirrhotic appearing liver and 1.4 cm cystic lesion in the pancreatic tail. Nonemergent abdominal MRI/MRCP is recommended.      CXR:  Heart magnified by technique.    Minimal nodular density over the right lower lung field could represent nipple.    There is a slight linear atelectatic area left base new since August 17.    IMPRESSION: Slight lower lung field findings as above.

## 2024-09-07 NOTE — H&P ADULT - PROBLEM SELECTOR PLAN 3
Goes to [facility] methadone clinic for weekly supply   Continue daily dose   - Methadone 90mg

## 2024-09-08 VITALS — DIASTOLIC BLOOD PRESSURE: 91 MMHG | OXYGEN SATURATION: 95 % | HEART RATE: 74 BPM | SYSTOLIC BLOOD PRESSURE: 166 MMHG

## 2024-09-08 LAB
ANION GAP SERPL CALC-SCNC: 7 MMOL/L — SIGNIFICANT CHANGE UP (ref 5–17)
BUN SERPL-MCNC: 12 MG/DL — SIGNIFICANT CHANGE UP (ref 7–23)
CALCIUM SERPL-MCNC: 9.1 MG/DL — SIGNIFICANT CHANGE UP (ref 8.5–10.1)
CHLORIDE SERPL-SCNC: 104 MMOL/L — SIGNIFICANT CHANGE UP (ref 96–108)
CO2 SERPL-SCNC: 26 MMOL/L — SIGNIFICANT CHANGE UP (ref 22–31)
CREAT SERPL-MCNC: 0.69 MG/DL — SIGNIFICANT CHANGE UP (ref 0.5–1.3)
EGFR: 98 ML/MIN/1.73M2 — SIGNIFICANT CHANGE UP
GLUCOSE SERPL-MCNC: 78 MG/DL — SIGNIFICANT CHANGE UP (ref 70–99)
HCT VFR BLD CALC: 44.7 % — SIGNIFICANT CHANGE UP (ref 39–50)
HGB BLD-MCNC: 15.1 G/DL — SIGNIFICANT CHANGE UP (ref 13–17)
MAGNESIUM SERPL-MCNC: 2.4 MG/DL — SIGNIFICANT CHANGE UP (ref 1.6–2.6)
MCHC RBC-ENTMCNC: 31.7 PG — SIGNIFICANT CHANGE UP (ref 27–34)
MCHC RBC-ENTMCNC: 33.8 G/DL — SIGNIFICANT CHANGE UP (ref 32–36)
MCV RBC AUTO: 93.7 FL — SIGNIFICANT CHANGE UP (ref 80–100)
NRBC # BLD: 0 /100 WBCS — SIGNIFICANT CHANGE UP (ref 0–0)
PHOSPHATE SERPL-MCNC: 3.7 MG/DL — SIGNIFICANT CHANGE UP (ref 2.5–4.5)
PLATELET # BLD AUTO: 157 K/UL — SIGNIFICANT CHANGE UP (ref 150–400)
POTASSIUM SERPL-MCNC: 3.9 MMOL/L — SIGNIFICANT CHANGE UP (ref 3.5–5.3)
POTASSIUM SERPL-SCNC: 3.9 MMOL/L — SIGNIFICANT CHANGE UP (ref 3.5–5.3)
RBC # BLD: 4.77 M/UL — SIGNIFICANT CHANGE UP (ref 4.2–5.8)
RBC # FLD: 12.8 % — SIGNIFICANT CHANGE UP (ref 10.3–14.5)
SODIUM SERPL-SCNC: 137 MMOL/L — SIGNIFICANT CHANGE UP (ref 135–145)
WBC # BLD: 7.53 K/UL — SIGNIFICANT CHANGE UP (ref 3.8–10.5)
WBC # FLD AUTO: 7.53 K/UL — SIGNIFICANT CHANGE UP (ref 3.8–10.5)

## 2024-09-08 PROCEDURE — 99239 HOSP IP/OBS DSCHRG MGMT >30: CPT

## 2024-09-08 PROCEDURE — 99232 SBSQ HOSP IP/OBS MODERATE 35: CPT

## 2024-09-08 RX ORDER — METOPROLOL TARTRATE 100 MG/1
1 TABLET ORAL
Qty: 30 | Refills: 0
Start: 2024-09-08 | End: 2024-10-07

## 2024-09-08 RX ORDER — POLYETHYLENE GLYCOL 3350 17 G/17G
17 POWDER, FOR SOLUTION ORAL
Qty: 0 | Refills: 0 | DISCHARGE
Start: 2024-09-08

## 2024-09-08 RX ORDER — SENNA 187 MG
2 TABLET ORAL
Qty: 0 | Refills: 0 | DISCHARGE
Start: 2024-09-08

## 2024-09-08 RX ADMIN — METOPROLOL TARTRATE 25 MILLIGRAM(S): 100 TABLET ORAL at 06:20

## 2024-09-08 RX ADMIN — METHADONE HYDROCHLORIDE 90 MILLIGRAM(S): 1 POWDER ORAL at 11:03

## 2024-09-08 RX ADMIN — APIXABAN 5 MILLIGRAM(S): 5 TABLET, FILM COATED ORAL at 06:19

## 2024-09-08 NOTE — DISCHARGE NOTE NURSING/CASE MANAGEMENT/SOCIAL WORK - PATIENT PORTAL LINK FT
You can access the FollowMyHealth Patient Portal offered by [facility] by registering at the following website: [email or web address]. By joining [facility]’s FollowMyHealth portal, you will also be able to view your health information using other applications (apps) compatible with our system.

## 2024-09-08 NOTE — CHART NOTE - NSCHARTNOTEFT_GEN_A_CORE
Consult received for "MST Score = />2." Upon chart review, patient with stable weight, does not currently meet criteria for Protein Calorie Malnutrition risk per MST. Patient has also been screened for and presents negative for    -Pressure ulcers stage 2 or greater  -Enteral or Parenteral Nutrition  -BMI <18  -DngqxasjxeF8H >8  -Chewing/Swallowing Difficulty  -Decreased appetite  -Unintentional Weight Loss  -Inadequate diet (i.e. NPO/Clear Liquids)    No intervention required at this time. RD remains available.       RD remains available for assessment per protocol or as needed.

## 2024-09-08 NOTE — PROGRESS NOTE ADULT - PROBLEM SELECTOR PLAN 4
BNP: 3189,   December 2023 Echo:  Left ventricular  ejection fraction, by visual estimation, is 30 to 35%.  -Patient appears compensated.   - Monitor    Discharge, Pending PT eval.

## 2024-09-08 NOTE — PROGRESS NOTE ADULT - SUBJECTIVE AND OBJECTIVE BOX
PROGRESS NOTE:     Patient is a 74y old  Male who presents with a chief complaint of Afib (07 Sep 2024 14:10)      SUBJECTIVE / OVERNIGHT EVENTS: No acute overnight events.         MEDICATIONS  (STANDING):  apixaban 5 milliGRAM(s) Oral every 12 hours  methadone    Tablet 90 milliGRAM(s) Oral daily  metoprolol succinate ER 25 milliGRAM(s) Oral daily  polyethylene glycol 3350 17 Gram(s) Oral two times a day  senna 2 Tablet(s) Oral at bedtime    MEDICATIONS  (PRN):  acetaminophen     Tablet .. 650 milliGRAM(s) Oral every 6 hours PRN Temp greater or equal to 38C (100.4F), Mild Pain (1 - 3)  aluminum hydroxide/magnesium hydroxide/simethicone Suspension 30 milliLiter(s) Oral every 4 hours PRN Dyspepsia  melatonin 3 milliGRAM(s) Oral at bedtime PRN Insomnia  ondansetron Injectable 4 milliGRAM(s) IV Push every 8 hours PRN Nausea and/or Vomiting      CAPILLARY BLOOD GLUCOSE        I&O's Summary      PHYSICAL EXAM:  Vital Signs Last 24 Hrs  T(C): 36.4 (08 Sep 2024 10:11), Max: 36.8 (07 Sep 2024 23:30)  T(F): 97.6 (08 Sep 2024 10:11), Max: 98.2 (07 Sep 2024 23:30)  HR: 68 (08 Sep 2024 10:11) (68 - 97)  BP: 136/89 (08 Sep 2024 10:11) (113/72 - 155/105)  BP(mean): --  RR: 18 (08 Sep 2024 10:11) (18 - 18)  SpO2: 97% (08 Sep 2024 10:11) (95% - 97%)    Parameters below as of 08 Sep 2024 10:11  Patient On (Oxygen Delivery Method): room air        CONSTITUTIONAL: NAD, well-developed  RESPIRATORY: Normal respiratory effort; lungs are clear to auscultation bilaterally  CARDIOVASCULAR: Regular rate and rhythm, normal S1 and S2, no murmur/rub/gallop; L>R LE edema. Peripheral pulses are 2+ bilaterally  ABDOMEN: Nontender to palpation, normoactive bowel sounds, no rebound/guarding; No hepatosplenomegaly  MUSCLOSKELETAL: no clubbing or cyanosis of digits; no joint swelling or tenderness to palpation  PSYCH: A+O to person, place, and time; affect appropriate    LABS:                        15.1   7.53  )-----------( 157      ( 08 Sep 2024 06:05 )             44.7     09-08    137  |  104  |  12  ----------------------------<  78  3.9   |  26  |  0.69    Ca    9.1      08 Sep 2024 06:05  Phos  3.7     09-08  Mg     2.4     09-08    TPro  6.8  /  Alb  3.1<L>  /  TBili  0.8  /  DBili  x   /  AST  29  /  ALT  29  /  AlkPhos  93  09-07    PT/INR - ( 06 Sep 2024 14:18 )   PT: 11.2 sec;   INR: 0.94 ratio         PTT - ( 06 Sep 2024 14:18 )  PTT:34.7 sec      Urinalysis Basic - ( 08 Sep 2024 06:05 )    Color: x / Appearance: x / SG: x / pH: x  Gluc: 78 mg/dL / Ketone: x  / Bili: x / Urobili: x   Blood: x / Protein: x / Nitrite: x   Leuk Esterase: x / RBC: x / WBC x   Sq Epi: x / Non Sq Epi: x / Bacteria: x          RADIOLOGY & ADDITIONAL TESTS:  Results Reviewed:   Imaging Personally Reviewed:  Electrocardiogram Personally Reviewed:    COORDINATION OF CARE:  Care Discussed with Consultants/Other Providers [Y/N]:  Prior or Outpatient Records Reviewed [Y/N]:  
PROGRESS NOTE:     Patient is a 74y old  Male who presents with a chief complaint of Afib (07 Sep 2024 02:06)      SUBJECTIVE / OVERNIGHT EVENTS: No acute overnight events. Patient states he is feeling better, less general weakness.     ADDITIONAL REVIEW OF SYSTEMS:    MEDICATIONS  (STANDING):  apixaban 5 milliGRAM(s) Oral every 12 hours  methadone    Tablet 90 milliGRAM(s) Oral daily  metoprolol succinate ER 25 milliGRAM(s) Oral daily  polyethylene glycol 3350 17 Gram(s) Oral two times a day  senna 2 Tablet(s) Oral at bedtime    MEDICATIONS  (PRN):  acetaminophen     Tablet .. 650 milliGRAM(s) Oral every 6 hours PRN Temp greater or equal to 38C (100.4F), Mild Pain (1 - 3)  aluminum hydroxide/magnesium hydroxide/simethicone Suspension 30 milliLiter(s) Oral every 4 hours PRN Dyspepsia  melatonin 3 milliGRAM(s) Oral at bedtime PRN Insomnia  ondansetron Injectable 4 milliGRAM(s) IV Push every 8 hours PRN Nausea and/or Vomiting      CAPILLARY BLOOD GLUCOSE        I&O's Summary    06 Sep 2024 07:01  -  07 Sep 2024 07:00  --------------------------------------------------------  IN: 0 mL / OUT: 700 mL / NET: -700 mL        PHYSICAL EXAM:  Vital Signs Last 24 Hrs  T(C): 36.6 (07 Sep 2024 11:16), Max: 37 (06 Sep 2024 19:27)  T(F): 97.9 (07 Sep 2024 11:16), Max: 98.6 (06 Sep 2024 19:27)  HR: 89 (07 Sep 2024 11:16) (58 - 108)  BP: 130/75 (07 Sep 2024 11:16) (128/89 - 162/114)  BP(mean): --  RR: 17 (07 Sep 2024 11:16) (17 - 20)  SpO2: 93% (07 Sep 2024 11:16) (93% - 98%)    Parameters below as of 07 Sep 2024 11:16  Patient On (Oxygen Delivery Method): room air        CONSTITUTIONAL: NAD, well-developed  RESPIRATORY: Normal respiratory effort; lungs are clear to auscultation bilaterally  CARDIOVASCULAR: Regular rate and rhythm, normal S1 and S2, no murmur/rub/gallop; L>R LE edema. Peripheral pulses are 2+ bilaterally  ABDOMEN: Nontender to palpation, normoactive bowel sounds, no rebound/guarding; No hepatosplenomegaly  MUSCLOSKELETAL: no clubbing or cyanosis of digits; no joint swelling or tenderness to palpation  PSYCH: A+O to person, place, and time; affect appropriate    LABS:                        14.3   7.64  )-----------( 159      ( 07 Sep 2024 10:44 )             42.2     09-07    138  |  107  |  11  ----------------------------<  105<H>  4.1   |  27  |  0.71    Ca    8.8      07 Sep 2024 10:44  Mg     2.3     09-06    TPro  6.8  /  Alb  3.1<L>  /  TBili  0.8  /  DBili  x   /  AST  29  /  ALT  29  /  AlkPhos  93  09-07    PT/INR - ( 06 Sep 2024 14:18 )   PT: 11.2 sec;   INR: 0.94 ratio         PTT - ( 06 Sep 2024 14:18 )  PTT:34.7 sec      Urinalysis Basic - ( 07 Sep 2024 10:44 )    Color: x / Appearance: x / SG: x / pH: x  Gluc: 105 mg/dL / Ketone: x  / Bili: x / Urobili: x   Blood: x / Protein: x / Nitrite: x   Leuk Esterase: x / RBC: x / WBC x   Sq Epi: x / Non Sq Epi: x / Bacteria: x          RADIOLOGY & ADDITIONAL TESTS:  Results Reviewed:   Imaging Personally Reviewed:  Electrocardiogram Personally Reviewed:    COORDINATION OF CARE:  Care Discussed with Consultants/Other Providers [Y/N]:  Prior or Outpatient Records Reviewed [Y/N]:

## 2024-09-08 NOTE — PHYSICAL THERAPY INITIAL EVALUATION ADULT - PERTINENT HX OF CURRENT PROBLEM, REHAB EVAL
73-year-old male with a MH of A-fib (non-complaint with meds),  Heroine use on methadone presents to the ED for generalized weakness. found to be in  Afib with RVR(HR 130s),

## 2024-09-08 NOTE — PHYSICAL THERAPY INITIAL EVALUATION ADULT - ADDITIONAL COMMENTS
Pt states he lives in a PH [facility] with HRs and resides on main level with step son who can assist as needed. Pt states he was able to perform functional mobility independently using a cane.

## 2024-09-08 NOTE — PROGRESS NOTE ADULT - ASSESSMENT
73-year-old male with a MH of A-fib (non-complaint with meds),  Heroine use on methadone presents to the ED for generalized weakness. found to be in  Afib with RVR(HR 130s),     
73-year-old male with a MH of A-fib (non-complaint with meds),  Heroine use on methadone presents to the ED for generalized weakness. found to be in  Afib with RVR(HR 130s),

## 2024-09-08 NOTE — PROGRESS NOTE ADULT - PROBLEM SELECTOR PLAN 3
Goes to [facility] methadone clinic for weekly supply   Continue daily dose   - Methadone 90mg
Goes to [facility] methadone clinic for weekly supply   Continue daily dose   - Methadone 90mg

## 2024-09-08 NOTE — DISCHARGE NOTE PROVIDER - NSDCCPCAREPLAN_GEN_ALL_CORE_FT
PRINCIPAL DISCHARGE DIAGNOSIS  Diagnosis: Atrial fibrillation  Assessment and Plan of Treatment:      PRINCIPAL DISCHARGE DIAGNOSIS  Diagnosis: Atrial fibrillation  Assessment and Plan of Treatment: You came to the hospital with weakness which has resolved. You were evaluated by PT, and determined not to need physical therapy/rehab. YOu were also noted to be in atrial fibrillation (not new). Please continue to take your medications as presscribed and follow up with your primary doctor and cardiologist.

## 2024-09-08 NOTE — PROGRESS NOTE ADULT - PROBLEM SELECTOR PLAN 2
-improved  CT-Abd: Moderate to large amount of stool throughout the colon  -likely related to chronic opiate use  - Senna   - Miralax   - Monitor
Last BM 1 week ago  CT-Abd: Moderate to large amount of stool throughout the colon  - Senna   - Miralax   - Consider enema if no relief   - Monitor

## 2024-09-08 NOTE — PROGRESS NOTE ADULT - PROBLEM SELECTOR PLAN 1
History of A-Fib. Non-Compliant with medication   Found to be in Afib with RVR in the ED with 's responded to Lopressor   - Tele   - Metoprolol 25mg , uptitrate as needed, goal <110 if asymptomatic, <80 if symptomatic  - Eliquis   - Dash Diet   -TTE from 2023 with Global LV dysfunction with LVEF 30-35% with Moderate MR, moderate LVH
History of A-Fib. Non-Compliant with medication   Found to be in Afib with RVR in the ED with 's responded to Lopressor   -rates are now controlled.   - Tele   - Metoprolol succinate 25mg Po daily. uptitrate as needed, goal <110 if asymptomatic, <80 if symptomatic  - c/w Eliquis   - Dash Diet   -TTE from 2023 with Global LV dysfunction with LVEF 30-35% with Moderate MR, moderate LVH

## 2024-09-08 NOTE — DISCHARGE NOTE PROVIDER - NSDCMRMEDTOKEN_GEN_ALL_CORE_FT
apixaban 5 mg oral tablet: 1 tab(s) orally every 12 hours  methadone 10 mg/5 mL oral solution: 90 milligram(s) orally once a day  metoprolol succinate 25 mg oral tablet, extended release: 1 tab(s) orally once a day  polyethylene glycol 3350 oral powder for reconstitution: 17 gram(s) orally 2 times a day  senna leaf extract oral tablet: 2 tab(s) orally once a day (at bedtime)

## 2024-09-08 NOTE — DISCHARGE NOTE PROVIDER - HOSPITAL COURSE
73-year-old male with a MH of A-fib (non-complaint with meds),  Heroine use on methadone presents to the ED for generalized weakness.  Endorses 2 day history of weakness, fatigue, lightheadedness. Endorses yesterday he doubled the dose of his methadone, which he thought would make him feel better. In the ED patient was found to be in Afib with RVR(HR 130s), endorses he has not been taking his medication for a while. Initially stated he stopped them himself because he was not having any symptoms then reported he ran out of his medication. Also endorses constipation, which he contributed to his chronic use of methadone, last BM over 1 week ago. Denies palpitation, chest pain, SOB, dizziness, abdominal pain, N/V/D, fever, chills or any other acute symptoms. Labs unremarkable. BP: 174/113 -->128/89, HR:131 --> 96. Received 1L-NS, Lopressor 5mg IV & 25mg PO.  CT-Abd: There is a 1.3 cm right middle lobe nodule, moderate to large amount of stool throughout the colon, cirrhotic appearing liver and 1.4 cm cystic lesion in the pancreatic tail.      # Atrial fibrillation.   -History of A-Fib. Non-Compliant with medication   -Found to be in Afib with RVR in the ED with 's responded to Lopressor   -rates are now controlled.   - Metoprolol succinate 25mg Po daily  - c/w Eliquis   - Dash diet  -TTE from 2023 with Global LV dysfunction with LVEF 30-35% with Moderate MR, moderate LVH.    # Constipation.   -improved  -CT-Abd: Moderate to large amount of stool throughout the colon  -likely related to chronic opiate use  - Senna & miralax    # Methadone dependence.   -Goes to [facility] methadone clinic for weekly supply   -Continue daily dose Methadone 90mg.    # Elevated brain natriuretic peptide (BNP) level.   -BNP: 3189,   -December 2023 Echo:  Left ventricular  ejection fraction, by visual estimation, is 30 to 35%.  -Patient appears compensated.

## 2024-09-13 ENCOUNTER — EMERGENCY (EMERGENCY)
Facility: HOSPITAL | Age: 74
LOS: 0 days | Discharge: LEFT BEFORE DISCHARGE | End: 2024-09-14
Attending: STUDENT IN AN ORGANIZED HEALTH CARE EDUCATION/TRAINING PROGRAM
Payer: MEDICARE

## 2024-09-13 DIAGNOSIS — Z53.29 PROCEDURE AND TREATMENT NOT CARRIED OUT BECAUSE OF PATIENT'S DECISION FOR OTHER REASONS: ICD-10-CM

## 2024-09-13 DIAGNOSIS — K59.03 DRUG INDUCED CONSTIPATION: ICD-10-CM

## 2024-09-13 DIAGNOSIS — I48.91 UNSPECIFIED ATRIAL FIBRILLATION: ICD-10-CM

## 2024-09-13 DIAGNOSIS — F17.200 NICOTINE DEPENDENCE, UNSPECIFIED, UNCOMPLICATED: ICD-10-CM

## 2024-09-13 DIAGNOSIS — Z86.19 PERSONAL HISTORY OF OTHER INFECTIOUS AND PARASITIC DISEASES: ICD-10-CM

## 2024-09-13 DIAGNOSIS — I48.92 UNSPECIFIED ATRIAL FLUTTER: ICD-10-CM

## 2024-09-13 DIAGNOSIS — Z91.148 PATIENT'S OTHER NONCOMPLIANCE WITH MEDICATION REGIMEN FOR OTHER REASON: ICD-10-CM

## 2024-09-13 DIAGNOSIS — Z79.01 LONG TERM (CURRENT) USE OF ANTICOAGULANTS: ICD-10-CM

## 2024-09-13 DIAGNOSIS — T40.2X5A ADVERSE EFFECT OF OTHER OPIOIDS, INITIAL ENCOUNTER: ICD-10-CM

## 2024-09-13 DIAGNOSIS — F11.20 OPIOID DEPENDENCE, UNCOMPLICATED: ICD-10-CM

## 2024-09-13 DIAGNOSIS — R55 SYNCOPE AND COLLAPSE: ICD-10-CM

## 2024-09-13 DIAGNOSIS — F11.10 OPIOID ABUSE, UNCOMPLICATED: ICD-10-CM

## 2024-09-13 DIAGNOSIS — R79.89 OTHER SPECIFIED ABNORMAL FINDINGS OF BLOOD CHEMISTRY: ICD-10-CM

## 2024-09-13 DIAGNOSIS — T45.516A UNDERDOSING OF ANTICOAGULANTS, INITIAL ENCOUNTER: ICD-10-CM

## 2024-09-13 DIAGNOSIS — S72.009A FRACTURE OF UNSPECIFIED PART OF NECK OF UNSPECIFIED FEMUR, INITIAL ENCOUNTER FOR CLOSED FRACTURE: Chronic | ICD-10-CM

## 2024-09-13 DIAGNOSIS — R53.1 WEAKNESS: ICD-10-CM

## 2024-09-13 DIAGNOSIS — T50.906A UNDERDOSING OF UNSPECIFIED DRUGS, MEDICAMENTS AND BIOLOGICAL SUBSTANCES, INITIAL ENCOUNTER: ICD-10-CM

## 2024-09-13 PROCEDURE — 99285 EMERGENCY DEPT VISIT HI MDM: CPT

## 2024-09-14 VITALS
DIASTOLIC BLOOD PRESSURE: 98 MMHG | OXYGEN SATURATION: 98 % | SYSTOLIC BLOOD PRESSURE: 138 MMHG | RESPIRATION RATE: 18 BRPM | HEART RATE: 104 BPM | TEMPERATURE: 99 F

## 2024-09-14 VITALS
OXYGEN SATURATION: 97 % | DIASTOLIC BLOOD PRESSURE: 100 MMHG | WEIGHT: 179.9 LBS | HEIGHT: 72 IN | TEMPERATURE: 99 F | RESPIRATION RATE: 18 BRPM | SYSTOLIC BLOOD PRESSURE: 132 MMHG | HEART RATE: 168 BPM

## 2024-09-14 LAB
ALBUMIN SERPL ELPH-MCNC: 3.4 G/DL — SIGNIFICANT CHANGE UP (ref 3.3–5)
ALP SERPL-CCNC: 126 U/L — HIGH (ref 40–120)
ALT FLD-CCNC: 36 U/L — SIGNIFICANT CHANGE UP (ref 12–78)
ANION GAP SERPL CALC-SCNC: 3 MMOL/L — LOW (ref 5–17)
AST SERPL-CCNC: 30 U/L — SIGNIFICANT CHANGE UP (ref 15–37)
BASOPHILS # BLD AUTO: 0.03 K/UL — SIGNIFICANT CHANGE UP (ref 0–0.2)
BASOPHILS NFR BLD AUTO: 0.4 % — SIGNIFICANT CHANGE UP (ref 0–2)
BILIRUB SERPL-MCNC: 0.5 MG/DL — SIGNIFICANT CHANGE UP (ref 0.2–1.2)
BUN SERPL-MCNC: 18 MG/DL — SIGNIFICANT CHANGE UP (ref 7–23)
CALCIUM SERPL-MCNC: 8.7 MG/DL — SIGNIFICANT CHANGE UP (ref 8.5–10.1)
CHLORIDE SERPL-SCNC: 101 MMOL/L — SIGNIFICANT CHANGE UP (ref 96–108)
CO2 SERPL-SCNC: 31 MMOL/L — SIGNIFICANT CHANGE UP (ref 22–31)
CREAT SERPL-MCNC: 0.86 MG/DL — SIGNIFICANT CHANGE UP (ref 0.5–1.3)
EGFR: 91 ML/MIN/1.73M2 — SIGNIFICANT CHANGE UP
EOSINOPHIL # BLD AUTO: 0.06 K/UL — SIGNIFICANT CHANGE UP (ref 0–0.5)
EOSINOPHIL NFR BLD AUTO: 0.9 % — SIGNIFICANT CHANGE UP (ref 0–6)
GLUCOSE SERPL-MCNC: 124 MG/DL — HIGH (ref 70–99)
HCT VFR BLD CALC: 46.8 % — SIGNIFICANT CHANGE UP (ref 39–50)
HGB BLD-MCNC: 15.8 G/DL — SIGNIFICANT CHANGE UP (ref 13–17)
IMM GRANULOCYTES NFR BLD AUTO: 0.3 % — SIGNIFICANT CHANGE UP (ref 0–0.9)
LYMPHOCYTES # BLD AUTO: 1.28 K/UL — SIGNIFICANT CHANGE UP (ref 1–3.3)
LYMPHOCYTES # BLD AUTO: 18.8 % — SIGNIFICANT CHANGE UP (ref 13–44)
MAGNESIUM SERPL-MCNC: 2.2 MG/DL — SIGNIFICANT CHANGE UP (ref 1.6–2.6)
MCHC RBC-ENTMCNC: 31.9 PG — SIGNIFICANT CHANGE UP (ref 27–34)
MCHC RBC-ENTMCNC: 33.8 G/DL — SIGNIFICANT CHANGE UP (ref 32–36)
MCV RBC AUTO: 94.5 FL — SIGNIFICANT CHANGE UP (ref 80–100)
MONOCYTES # BLD AUTO: 0.87 K/UL — SIGNIFICANT CHANGE UP (ref 0–0.9)
MONOCYTES NFR BLD AUTO: 12.8 % — SIGNIFICANT CHANGE UP (ref 2–14)
NEUTROPHILS # BLD AUTO: 4.56 K/UL — SIGNIFICANT CHANGE UP (ref 1.8–7.4)
NEUTROPHILS NFR BLD AUTO: 66.8 % — SIGNIFICANT CHANGE UP (ref 43–77)
NRBC # BLD: 0 /100 WBCS — SIGNIFICANT CHANGE UP (ref 0–0)
NT-PROBNP SERPL-SCNC: 1032 PG/ML — HIGH (ref 0–125)
PLATELET # BLD AUTO: 180 K/UL — SIGNIFICANT CHANGE UP (ref 150–400)
POTASSIUM SERPL-MCNC: 3.9 MMOL/L — SIGNIFICANT CHANGE UP (ref 3.5–5.3)
POTASSIUM SERPL-SCNC: 3.9 MMOL/L — SIGNIFICANT CHANGE UP (ref 3.5–5.3)
PROT SERPL-MCNC: 7.4 GM/DL — SIGNIFICANT CHANGE UP (ref 6–8.3)
RBC # BLD: 4.95 M/UL — SIGNIFICANT CHANGE UP (ref 4.2–5.8)
RBC # FLD: 12.7 % — SIGNIFICANT CHANGE UP (ref 10.3–14.5)
SODIUM SERPL-SCNC: 135 MMOL/L — SIGNIFICANT CHANGE UP (ref 135–145)
TROPONIN I, HIGH SENSITIVITY RESULT: 9.6 NG/L — SIGNIFICANT CHANGE UP
WBC # BLD: 6.82 K/UL — SIGNIFICANT CHANGE UP (ref 3.8–10.5)
WBC # FLD AUTO: 6.82 K/UL — SIGNIFICANT CHANGE UP (ref 3.8–10.5)

## 2024-09-14 PROCEDURE — 71045 X-RAY EXAM CHEST 1 VIEW: CPT | Mod: 26

## 2024-09-14 PROCEDURE — 93010 ELECTROCARDIOGRAM REPORT: CPT

## 2024-09-14 NOTE — ED ADULT TRIAGE NOTE - CHIEF COMPLAINT QUOTE
Patient reports dizziness "worst over past 2-3 days. I feel like I am going to pass out. I feel lousy." Patient walks with cane: reports chronic knee pain 4/10. Heart rate from 102-168 in triage  PMH: Hepatitis C, Chronic Knee pain, Heroin abuse: IV and snorting,  Methadone therapy

## 2024-09-14 NOTE — ED PROVIDER NOTE - OBJECTIVE STATEMENT
74 M pmh Afib, hepatitis (non-compliant w/ meds), opioid use disorder on methadone presenting to the ED for syncope and weakness. Pt states that he has been feeling weak and he has had multiple episodes of passing out. Pt states that he thinks that it is because he has not gotten his methadone in a few days. Pt denies chest pain, shortness of breath, dizziness
Patient

## 2024-09-14 NOTE — ED ADULT NURSE NOTE - NSHOSCREENINGQ1_ED_ALL_ED
I called the patient reviewed pathology  She did have a small adenoma  Positive family history of colon cancer  Recall exam for 5 years    Copy patient's PCP Dr Cary Mederos
No

## 2024-09-14 NOTE — ED PROVIDER NOTE - PATIENT PORTAL LINK FT
You can access the FollowMyHealth Patient Portal offered by [facility] by registering at the following website: [email or web address]. By joining [employer]’s FollowMyHealth portal, you will also be able to view your health information using other applications (apps) compatible with our system.

## 2024-09-14 NOTE — ED PROVIDER NOTE - PHYSICAL EXAMINATION
General: Well appearing elderly male in no acute distress  HEENT: Normocephalic, atraumatic. Moist mucous membranes. Oropharynx clear. No lymphadenopathy.  Eyes: No scleral icterus. EOMI. ELINOR. ~4mm, reactive  Neck:. Soft and supple. Full ROM without pain. No midline tenderness  Cardiac: irregular rate & rhythm. No murmurs, rubs, gallops. Peripheral pulses 2+ and symmetric. No LE edema.  Resp: Lungs CTAB. Speaking in full sentences. No wheezes, rales or rhonchi.  Abd: Soft, non-tender, non-distended. No guarding or rebound. No scars, masses, or lesions.  Back: Spine midline and non-tender. No CVA tenderness.    Skin: No rashes, abrasions, or lacerations.  Neuro: AO x 3. Moves all extremities symmetrically. Motor strength and sensation grossly intact. ambulatory w/ steady gait

## 2024-09-14 NOTE — ED PROVIDER NOTE - NS ED ROS FT
General: Denies fever, chills.   HEENT: Denies sensory changes, sore throat  Neck: Denies neck pain, neck stiffness  Resp: Denies coughing, SOB  Cardiovascular: Denies CP, palpitations, LE edema  GI: Denies nausea, vomiting, abdominal pain, diarrhea, constipation, blood in stool  : Denies dysuria, hematuria, frequency, incontinence  MSK: Denies back pain  Neuro: Denies HA, dizziness, numbness, weakness; + syncope  Skin: Denies rashes.

## 2024-09-14 NOTE — ED PROVIDER NOTE - CONSIDERATION OF ADMISSION OBSERVATION
Consideration of Admission/Observation offered admission for syncope in setting of paroxysmal Aflutter. intermittent rate control.

## 2024-09-14 NOTE — ED PROVIDER NOTE - CLINICAL SUMMARY MEDICAL DECISION MAKING FREE TEXT BOX
male w/ hx of aflutter, IVDU, opioud use disorder presenting to the ED for syncope.    EKG concerning for Aflutter w/ variable block (unchanged). no QTc prolongation  concern for rapid ventricular response  offered metoprolol but patient refused stating that he only wants methadone.   CT imaging of chest and admission offered to patient but he states that he does not want to remain in the hospital  The patient wishes to leave against medical advice.  I have discussed the risks, benefits and alternatives (including the possibility of worsening of disease, pain, permanent disability, and/or death) with the patient and his/her family (if available).  The patient voices understanding of these risks, benefits, and alternatives and still wishes to sign out against medical advice.  The patient is awake, alert, oriented  x 3 and has demonstrated capacity to refuse/direct care.  I have advised the patient that they can and should return immediately should they develop any worse/different/additional symptoms, or if they change their mind and want to continue their care.  Patient to leave against medical advice with return precautions

## 2024-09-14 NOTE — ED PROVIDER NOTE - NSFOLLOWUPINSTRUCTIONS_ED_ALL_ED_FT
Syncope    Syncope is when you temporarily lose consciousness, also called fainting or passing out. It is caused by a sudden decrease in blood flow to the brain. Even though most causes of syncope are not dangerous, syncope can possibly be a sign of a serious medical problem. Signs that you may be about to faint include feeling dizzy, lightheaded, nausea, visual changes, or cold/clammy skin. Do not drive, operate heavy machinery, or play sports until your health care provider says it is okay.    SEEK IMMEDIATE MEDICAL CARE IF YOU HAVE ANY OF THE FOLLOWING SYMPTOMS: severe headache, pain in your chest/abdomen/back, bleeding from your mouth or rectum, palpitations, shortness of breath, pain with breathing, seizure, confusion, or trouble walking.    Substance Abuse    Chemical dependency is an addiction to drugs or alcohol. It is characterized by the repeated behavior of seeking out and using drugs and alcohol despite harmful consequences to the health and safety of oneself and others. Using drugs in a manner that brought you to an Emergency Room suggests you may have an drug abuse problem. Seek help at a drug addiction center.    SEEK IMMEDIATE MEDICAL CARE IF YOU HAVE ANY OF THE FOLLOWING SYMPTOMS: chest pain, shortness of breath, change in mental status, thoughts about hurting killing yourself, thoughts about hurting or killing somebody else, hallucinations, or worsening depression.

## 2025-01-31 ENCOUNTER — EMERGENCY (EMERGENCY)
Facility: HOSPITAL | Age: 75
LOS: 0 days | Discharge: LEFT BEFORE DISCHARGE | End: 2025-01-31
Attending: EMERGENCY MEDICINE
Payer: MEDICARE

## 2025-01-31 VITALS
HEART RATE: 108 BPM | RESPIRATION RATE: 20 BRPM | SYSTOLIC BLOOD PRESSURE: 147 MMHG | OXYGEN SATURATION: 99 % | DIASTOLIC BLOOD PRESSURE: 124 MMHG

## 2025-01-31 VITALS
SYSTOLIC BLOOD PRESSURE: 164 MMHG | WEIGHT: 188.05 LBS | DIASTOLIC BLOOD PRESSURE: 91 MMHG | HEART RATE: 66 BPM | HEIGHT: 72 IN | RESPIRATION RATE: 20 BRPM | TEMPERATURE: 99 F | OXYGEN SATURATION: 96 %

## 2025-01-31 DIAGNOSIS — F11.20 OPIOID DEPENDENCE, UNCOMPLICATED: ICD-10-CM

## 2025-01-31 DIAGNOSIS — I50.9 HEART FAILURE, UNSPECIFIED: ICD-10-CM

## 2025-01-31 DIAGNOSIS — R00.2 PALPITATIONS: ICD-10-CM

## 2025-01-31 DIAGNOSIS — Z53.29 PROCEDURE AND TREATMENT NOT CARRIED OUT BECAUSE OF PATIENT'S DECISION FOR OTHER REASONS: ICD-10-CM

## 2025-01-31 DIAGNOSIS — I11.0 HYPERTENSIVE HEART DISEASE WITH HEART FAILURE: ICD-10-CM

## 2025-01-31 DIAGNOSIS — I48.91 UNSPECIFIED ATRIAL FIBRILLATION: ICD-10-CM

## 2025-01-31 DIAGNOSIS — S72.009A FRACTURE OF UNSPECIFIED PART OF NECK OF UNSPECIFIED FEMUR, INITIAL ENCOUNTER FOR CLOSED FRACTURE: Chronic | ICD-10-CM

## 2025-01-31 LAB
ALBUMIN SERPL ELPH-MCNC: 3.5 G/DL — SIGNIFICANT CHANGE UP (ref 3.3–5)
ALP SERPL-CCNC: 119 U/L — SIGNIFICANT CHANGE UP (ref 40–120)
ALT FLD-CCNC: 47 U/L — SIGNIFICANT CHANGE UP (ref 12–78)
ANION GAP SERPL CALC-SCNC: 8 MMOL/L — SIGNIFICANT CHANGE UP (ref 5–17)
AST SERPL-CCNC: 38 U/L — HIGH (ref 15–37)
BASOPHILS # BLD AUTO: 0.01 K/UL — SIGNIFICANT CHANGE UP (ref 0–0.2)
BASOPHILS NFR BLD AUTO: 0.2 % — SIGNIFICANT CHANGE UP (ref 0–2)
BILIRUB SERPL-MCNC: 0.7 MG/DL — SIGNIFICANT CHANGE UP (ref 0.2–1.2)
BUN SERPL-MCNC: 13 MG/DL — SIGNIFICANT CHANGE UP (ref 7–23)
CALCIUM SERPL-MCNC: 8.8 MG/DL — SIGNIFICANT CHANGE UP (ref 8.5–10.1)
CHLORIDE SERPL-SCNC: 105 MMOL/L — SIGNIFICANT CHANGE UP (ref 96–108)
CO2 SERPL-SCNC: 26 MMOL/L — SIGNIFICANT CHANGE UP (ref 22–31)
CREAT SERPL-MCNC: 0.67 MG/DL — SIGNIFICANT CHANGE UP (ref 0.5–1.3)
EGFR: 98 ML/MIN/1.73M2 — SIGNIFICANT CHANGE UP
EOSINOPHIL # BLD AUTO: 0.01 K/UL — SIGNIFICANT CHANGE UP (ref 0–0.5)
EOSINOPHIL NFR BLD AUTO: 0.2 % — SIGNIFICANT CHANGE UP (ref 0–6)
GLUCOSE SERPL-MCNC: 108 MG/DL — HIGH (ref 70–99)
HCT VFR BLD CALC: 44.5 % — SIGNIFICANT CHANGE UP (ref 39–50)
HGB BLD-MCNC: 14.9 G/DL — SIGNIFICANT CHANGE UP (ref 13–17)
IMM GRANULOCYTES NFR BLD AUTO: 0.3 % — SIGNIFICANT CHANGE UP (ref 0–0.9)
LYMPHOCYTES # BLD AUTO: 0.78 K/UL — LOW (ref 1–3.3)
LYMPHOCYTES # BLD AUTO: 12.5 % — LOW (ref 13–44)
MAGNESIUM SERPL-MCNC: 2 MG/DL — SIGNIFICANT CHANGE UP (ref 1.6–2.6)
MCHC RBC-ENTMCNC: 31.7 PG — SIGNIFICANT CHANGE UP (ref 27–34)
MCHC RBC-ENTMCNC: 33.5 G/DL — SIGNIFICANT CHANGE UP (ref 32–36)
MCV RBC AUTO: 94.7 FL — SIGNIFICANT CHANGE UP (ref 80–100)
MONOCYTES # BLD AUTO: 0.63 K/UL — SIGNIFICANT CHANGE UP (ref 0–0.9)
MONOCYTES NFR BLD AUTO: 10.1 % — SIGNIFICANT CHANGE UP (ref 2–14)
NEUTROPHILS # BLD AUTO: 4.8 K/UL — SIGNIFICANT CHANGE UP (ref 1.8–7.4)
NEUTROPHILS NFR BLD AUTO: 76.7 % — SIGNIFICANT CHANGE UP (ref 43–77)
NRBC # BLD: 0 /100 WBCS — SIGNIFICANT CHANGE UP (ref 0–0)
NT-PROBNP SERPL-SCNC: 2075 PG/ML — HIGH (ref 0–125)
PLATELET # BLD AUTO: 172 K/UL — SIGNIFICANT CHANGE UP (ref 150–400)
POTASSIUM SERPL-MCNC: 3.7 MMOL/L — SIGNIFICANT CHANGE UP (ref 3.5–5.3)
POTASSIUM SERPL-SCNC: 3.7 MMOL/L — SIGNIFICANT CHANGE UP (ref 3.5–5.3)
PROT SERPL-MCNC: 7.5 GM/DL — SIGNIFICANT CHANGE UP (ref 6–8.3)
RBC # BLD: 4.7 M/UL — SIGNIFICANT CHANGE UP (ref 4.2–5.8)
RBC # FLD: 12.3 % — SIGNIFICANT CHANGE UP (ref 10.3–14.5)
SODIUM SERPL-SCNC: 139 MMOL/L — SIGNIFICANT CHANGE UP (ref 135–145)
TROPONIN I, HIGH SENSITIVITY RESULT: 12.1 NG/L — SIGNIFICANT CHANGE UP
WBC # BLD: 6.25 K/UL — SIGNIFICANT CHANGE UP (ref 3.8–10.5)
WBC # FLD AUTO: 6.25 K/UL — SIGNIFICANT CHANGE UP (ref 3.8–10.5)

## 2025-01-31 PROCEDURE — 93010 ELECTROCARDIOGRAM REPORT: CPT

## 2025-01-31 PROCEDURE — 71045 X-RAY EXAM CHEST 1 VIEW: CPT | Mod: 26

## 2025-01-31 PROCEDURE — 99285 EMERGENCY DEPT VISIT HI MDM: CPT

## 2025-01-31 RX ORDER — METOPROLOL TARTRATE 50 MG
25 TABLET ORAL ONCE
Refills: 0 | Status: COMPLETED | OUTPATIENT
Start: 2025-01-31 | End: 2025-01-31

## 2025-01-31 RX ORDER — METHADONE HYDROCHLORIDE 10 MG/1
80 TABLET ORAL ONCE
Refills: 0 | Status: DISCONTINUED | OUTPATIENT
Start: 2025-01-31 | End: 2025-01-31

## 2025-01-31 RX ORDER — METOPROLOL TARTRATE 50 MG
5 TABLET ORAL ONCE
Refills: 0 | Status: COMPLETED | OUTPATIENT
Start: 2025-01-31 | End: 2025-01-31

## 2025-01-31 RX ORDER — FUROSEMIDE 20 MG
80 TABLET ORAL ONCE
Refills: 0 | Status: COMPLETED | OUTPATIENT
Start: 2025-01-31 | End: 2025-01-31

## 2025-01-31 RX ORDER — FUROSEMIDE 20 MG
1 TABLET ORAL
Qty: 14 | Refills: 0
Start: 2025-01-31 | End: 2025-02-13

## 2025-01-31 RX ADMIN — Medication 80 MILLIGRAM(S): at 03:48

## 2025-01-31 RX ADMIN — METHADONE HYDROCHLORIDE 80 MILLIGRAM(S): 10 TABLET ORAL at 04:13

## 2025-01-31 RX ADMIN — Medication 25 MILLIGRAM(S): at 03:51

## 2025-01-31 RX ADMIN — Medication 5 MILLIGRAM(S): at 03:45

## 2025-01-31 NOTE — ED ADULT NURSE NOTE - OBJECTIVE STATEMENT
76 yo M PMHx HTN. CHF, c/o palpitation, knee pain and retaining water. Verbalized "I need my methadone" NKDA

## 2025-01-31 NOTE — ED ADULT NURSE NOTE - NS PRO AD NO ADVANCE DIRECTIVE
This patient has upcoming appointment, no urgent lab results, will review with patient at that time  No

## 2025-01-31 NOTE — ED PROVIDER NOTE - PROGRESS NOTE DETAILS
The patient has decided to leave against medical advice.  The patient is AAOx3, not intoxicated, and displays normal decision making ability. We discussed all risks, benefits, and alternatives to the progression of treatment and the potential dangers of leaving including but not limited to permanent disability, injury, and death.  The patient was instructed that they are welcome to change their decision to leave against medical advice and return to the emergency department at any time and for any reason in order to allow us to render care. Results reported to patient--grossly benign, labs and imaging are grossly unrearmabkel thus far  Pt. reports feeling better after meds, hr improved, pt. feels no sob at this time--he does not want to stay in hospital for further treatment/monitoring   pt. agrees to f/u with primary care outpt., cardio referral given urgently   pt. understands to return to ED if symptoms worsen; will d/c AMA

## 2025-01-31 NOTE — ED PROVIDER NOTE - OBJECTIVE STATEMENT
76 yo M with fluid retention and requests methadone dose as he missed today.  Pt. shows his [place] methadone clinic card, which demonstrates his information and 80 mg methadone per day dose (last dose 1/30/25).  Pt. was referred to ER by his program for high heart rate (palpitations) and likely chf exacerbation. Pt. is historically non compliant with his home meds.  ROS: negative for fever, cough, headache, chest pain, shortness of breath, abd pain, nausea, vomiting, diarrhea, rash, paresthesia, and focal weakness--all other systems reviewed are negative.   PMH: a-fib, CHF, Meds: See EMR for list; SH: Denies smoking/drinking, hx of opiate abuse on methadone 80 mg daily

## 2025-01-31 NOTE — ED PROVIDER NOTE - PHYSICAL EXAMINATION
Vitals: HTN at 164/91  Gen: AAOx3, NAD, sitting comfortably in stretcher, non-toxic  Head: ncat, perrla, eomi b/l  Neck: supple, no lymphadenopathy, no midline deviation  Heart: rrr, no m/r/g  Lungs: CTA b/l, no rales/ronchi/wheezes  Abd: soft, nontender, non-distended, no rebound or guarding  Ext: no clubbing/cyanosis, 2+ pittin LE edema anterior ankle/tibia   Neuro: sensation and muscle strength intact b/l

## 2025-01-31 NOTE — ED PROVIDER NOTE - PATIENT PORTAL LINK FT
You can access the FollowMyHealth Patient Portal offered by [facility] by registering at the following website: [email or web address]. By joining [employer]’s FollowMyHealth portal, you will also be able to view your health information using other applications (apps) compatible with our system.

## 2025-01-31 NOTE — ED POST DISCHARGE NOTE - RESULT SUMMARY
patient came to the ed states he ran out of his lasix and unable to get appointment for cardiologist to refill. Pt received lasix here in the ed today. Rx lasix 20mg sent to the pharmacy.

## 2025-01-31 NOTE — ED PROVIDER NOTE - CLINICAL SUMMARY MEDICAL DECISION MAKING FREE TEXT BOX
74 yo M with likely chf exacerbation, doubt acs, will need methadone, lasix for chf, rate control for a-fib with rvr  -cbc, cmp, mag, bnp, trop, CXR, ekg ,iv, monitor, methadone, lopressor IV/PO, lasix 80, monitor  -f/u results, reeval

## 2025-01-31 NOTE — ED PROVIDER NOTE - CARE PROVIDER_API CALL
Erika Nicole  Interventional Cardiology  [address] [place] [address]  Phone: [phone]  Fax: [phone]  Follow Up Time: 1-3 Days

## 2025-01-31 NOTE — ED ADULT TRIAGE NOTE - CHIEF COMPLAINT QUOTE
Pt c/o palpitation, knee pain and retaining water. Verbalized "I need my methadone" Pmhx HTN CHF. Ekg done in triage. NKDA

## 2025-01-31 NOTE — ED PROVIDER NOTE - CARE PLAN
1 Principal Discharge DX:	Atrial fibrillation with RVR  Secondary Diagnosis:	CHF exacerbation  Secondary Diagnosis:	Methadone dependence

## 2025-01-31 NOTE — ED ADULT NURSE NOTE - CAS TRG GEN SKIN CONDITION
----- Message from Lilly sent at 10/8/2024  9:14 AM CDT -----  .Who Called: Linus Tidwell        Preferred Method of Contact: Phone Call  Patient's Preferred Phone Number on File: [phone]   Best Call Back Number, if different:[phone]  Additional Information: pt calling for refill but he don't know name of his water pill pt ask for Hope to call him   Warm/Dry

## 2025-04-21 ENCOUNTER — EMERGENCY (EMERGENCY)
Facility: HOSPITAL | Age: 75
LOS: 0 days | Discharge: LEFT BEFORE DISCHARGE | End: 2025-04-21
Attending: STUDENT IN AN ORGANIZED HEALTH CARE EDUCATION/TRAINING PROGRAM
Payer: MEDICARE

## 2025-04-21 VITALS
SYSTOLIC BLOOD PRESSURE: 138 MMHG | HEART RATE: 104 BPM | TEMPERATURE: 98 F | DIASTOLIC BLOOD PRESSURE: 88 MMHG | RESPIRATION RATE: 17 BRPM | OXYGEN SATURATION: 99 % | WEIGHT: 179.9 LBS

## 2025-04-21 VITALS
TEMPERATURE: 98 F | SYSTOLIC BLOOD PRESSURE: 138 MMHG | HEART RATE: 98 BPM | DIASTOLIC BLOOD PRESSURE: 77 MMHG | OXYGEN SATURATION: 95 % | RESPIRATION RATE: 16 BRPM

## 2025-04-21 DIAGNOSIS — R10.84 GENERALIZED ABDOMINAL PAIN: ICD-10-CM

## 2025-04-21 DIAGNOSIS — Z53.29 PROCEDURE AND TREATMENT NOT CARRIED OUT BECAUSE OF PATIENT'S DECISION FOR OTHER REASONS: ICD-10-CM

## 2025-04-21 DIAGNOSIS — R19.7 DIARRHEA, UNSPECIFIED: ICD-10-CM

## 2025-04-21 DIAGNOSIS — S72.009A FRACTURE OF UNSPECIFIED PART OF NECK OF UNSPECIFIED FEMUR, INITIAL ENCOUNTER FOR CLOSED FRACTURE: Chronic | ICD-10-CM

## 2025-04-21 DIAGNOSIS — R11.2 NAUSEA WITH VOMITING, UNSPECIFIED: ICD-10-CM

## 2025-04-21 DIAGNOSIS — I48.91 UNSPECIFIED ATRIAL FIBRILLATION: ICD-10-CM

## 2025-04-21 DIAGNOSIS — Z91.199 PATIENT'S NONCOMPLIANCE WITH OTHER MEDICAL TREATMENT AND REGIMEN DUE TO UNSPECIFIED REASON: ICD-10-CM

## 2025-04-21 LAB
ALBUMIN SERPL ELPH-MCNC: 3.2 G/DL — LOW (ref 3.3–5)
ALP SERPL-CCNC: 117 U/L — SIGNIFICANT CHANGE UP (ref 40–120)
ALT FLD-CCNC: 35 U/L — SIGNIFICANT CHANGE UP (ref 12–78)
ANION GAP SERPL CALC-SCNC: 7 MMOL/L — SIGNIFICANT CHANGE UP (ref 5–17)
APPEARANCE UR: CLEAR — SIGNIFICANT CHANGE UP
AST SERPL-CCNC: 41 U/L — HIGH (ref 15–37)
BASOPHILS # BLD AUTO: 0.02 K/UL — SIGNIFICANT CHANGE UP (ref 0–0.2)
BASOPHILS NFR BLD AUTO: 0.2 % — SIGNIFICANT CHANGE UP (ref 0–2)
BILIRUB SERPL-MCNC: 0.6 MG/DL — SIGNIFICANT CHANGE UP (ref 0.2–1.2)
BILIRUB UR-MCNC: NEGATIVE — SIGNIFICANT CHANGE UP
BUN SERPL-MCNC: 9 MG/DL — SIGNIFICANT CHANGE UP (ref 7–23)
CALCIUM SERPL-MCNC: 8.7 MG/DL — SIGNIFICANT CHANGE UP (ref 8.5–10.1)
CHLORIDE SERPL-SCNC: 104 MMOL/L — SIGNIFICANT CHANGE UP (ref 96–108)
CO2 SERPL-SCNC: 25 MMOL/L — SIGNIFICANT CHANGE UP (ref 22–31)
COLOR SPEC: YELLOW — SIGNIFICANT CHANGE UP
CREAT SERPL-MCNC: 0.69 MG/DL — SIGNIFICANT CHANGE UP (ref 0.5–1.3)
DIFF PNL FLD: NEGATIVE — SIGNIFICANT CHANGE UP
EGFR: 97 ML/MIN/1.73M2 — SIGNIFICANT CHANGE UP
EGFR: 97 ML/MIN/1.73M2 — SIGNIFICANT CHANGE UP
EOSINOPHIL # BLD AUTO: 0 K/UL — SIGNIFICANT CHANGE UP (ref 0–0.5)
EOSINOPHIL NFR BLD AUTO: 0 % — SIGNIFICANT CHANGE UP (ref 0–6)
FLUAV AG NPH QL: SIGNIFICANT CHANGE UP
FLUBV AG NPH QL: SIGNIFICANT CHANGE UP
GLUCOSE SERPL-MCNC: 114 MG/DL — HIGH (ref 70–99)
GLUCOSE UR QL: NEGATIVE MG/DL — SIGNIFICANT CHANGE UP
HCT VFR BLD CALC: 43.2 % — SIGNIFICANT CHANGE UP (ref 39–50)
HGB BLD-MCNC: 14.7 G/DL — SIGNIFICANT CHANGE UP (ref 13–17)
IMM GRANULOCYTES NFR BLD AUTO: 0.2 % — SIGNIFICANT CHANGE UP (ref 0–0.9)
KETONES UR-MCNC: NEGATIVE MG/DL — SIGNIFICANT CHANGE UP
LACTATE SERPL-SCNC: 2 MMOL/L — SIGNIFICANT CHANGE UP (ref 0.7–2)
LEUKOCYTE ESTERASE UR-ACNC: NEGATIVE — SIGNIFICANT CHANGE UP
LIDOCAIN IGE QN: 22 U/L — SIGNIFICANT CHANGE UP (ref 13–75)
LYMPHOCYTES # BLD AUTO: 0.83 K/UL — LOW (ref 1–3.3)
LYMPHOCYTES # BLD AUTO: 9.6 % — LOW (ref 13–44)
MCHC RBC-ENTMCNC: 32 PG — SIGNIFICANT CHANGE UP (ref 27–34)
MCHC RBC-ENTMCNC: 34 G/DL — SIGNIFICANT CHANGE UP (ref 32–36)
MCV RBC AUTO: 93.9 FL — SIGNIFICANT CHANGE UP (ref 80–100)
MONOCYTES # BLD AUTO: 0.73 K/UL — SIGNIFICANT CHANGE UP (ref 0–0.9)
MONOCYTES NFR BLD AUTO: 8.4 % — SIGNIFICANT CHANGE UP (ref 2–14)
NEUTROPHILS # BLD AUTO: 7.04 K/UL — SIGNIFICANT CHANGE UP (ref 1.8–7.4)
NEUTROPHILS NFR BLD AUTO: 81.6 % — HIGH (ref 43–77)
NITRITE UR-MCNC: NEGATIVE — SIGNIFICANT CHANGE UP
NRBC BLD AUTO-RTO: 0 /100 WBCS — SIGNIFICANT CHANGE UP (ref 0–0)
PH UR: 7.5 — SIGNIFICANT CHANGE UP (ref 5–8)
PLATELET # BLD AUTO: 153 K/UL — SIGNIFICANT CHANGE UP (ref 150–400)
POTASSIUM SERPL-MCNC: 4.4 MMOL/L — SIGNIFICANT CHANGE UP (ref 3.5–5.3)
POTASSIUM SERPL-SCNC: 4.4 MMOL/L — SIGNIFICANT CHANGE UP (ref 3.5–5.3)
PROT SERPL-MCNC: 7.8 GM/DL — SIGNIFICANT CHANGE UP (ref 6–8.3)
PROT UR-MCNC: NEGATIVE MG/DL — SIGNIFICANT CHANGE UP
RBC # BLD: 4.6 M/UL — SIGNIFICANT CHANGE UP (ref 4.2–5.8)
RBC # FLD: 12.4 % — SIGNIFICANT CHANGE UP (ref 10.3–14.5)
RSV RNA NPH QL NAA+NON-PROBE: SIGNIFICANT CHANGE UP
SARS-COV-2 RNA SPEC QL NAA+PROBE: SIGNIFICANT CHANGE UP
SODIUM SERPL-SCNC: 136 MMOL/L — SIGNIFICANT CHANGE UP (ref 135–145)
SOURCE RESPIRATORY: SIGNIFICANT CHANGE UP
SP GR SPEC: 1.01 — SIGNIFICANT CHANGE UP (ref 1–1.03)
UROBILINOGEN FLD QL: 0.2 MG/DL — SIGNIFICANT CHANGE UP (ref 0.2–1)
WBC # BLD: 8.64 K/UL — SIGNIFICANT CHANGE UP (ref 3.8–10.5)
WBC # FLD AUTO: 8.64 K/UL — SIGNIFICANT CHANGE UP (ref 3.8–10.5)

## 2025-04-21 PROCEDURE — 99285 EMERGENCY DEPT VISIT HI MDM: CPT

## 2025-04-21 PROCEDURE — 74177 CT ABD & PELVIS W/CONTRAST: CPT | Mod: 26

## 2025-04-21 RX ORDER — ONDANSETRON HCL/PF 4 MG/2 ML
4 VIAL (ML) INJECTION ONCE
Refills: 0 | Status: COMPLETED | OUTPATIENT
Start: 2025-04-21 | End: 2025-04-21

## 2025-04-21 RX ORDER — ACETAMINOPHEN 500 MG/5ML
1000 LIQUID (ML) ORAL ONCE
Refills: 0 | Status: COMPLETED | OUTPATIENT
Start: 2025-04-21 | End: 2025-04-21

## 2025-04-21 RX ORDER — KETOROLAC TROMETHAMINE 30 MG/ML
15 INJECTION, SOLUTION INTRAMUSCULAR; INTRAVENOUS ONCE
Refills: 0 | Status: DISCONTINUED | OUTPATIENT
Start: 2025-04-21 | End: 2025-04-21

## 2025-04-21 RX ORDER — MAGNESIUM, ALUMINUM HYDROXIDE 200-200 MG
30 TABLET,CHEWABLE ORAL ONCE
Refills: 0 | Status: COMPLETED | OUTPATIENT
Start: 2025-04-21 | End: 2025-04-21

## 2025-04-21 RX ADMIN — Medication 1000 MILLILITER(S): at 09:28

## 2025-04-21 RX ADMIN — Medication 20 MILLIGRAM(S): at 10:58

## 2025-04-21 RX ADMIN — KETOROLAC TROMETHAMINE 15 MILLIGRAM(S): 30 INJECTION, SOLUTION INTRAMUSCULAR; INTRAVENOUS at 10:59

## 2025-04-21 RX ADMIN — Medication 1000 MILLIGRAM(S): at 10:30

## 2025-04-21 RX ADMIN — Medication 400 MILLIGRAM(S): at 09:29

## 2025-04-21 RX ADMIN — Medication 30 MILLILITER(S): at 11:00

## 2025-04-21 RX ADMIN — Medication 4 MILLIGRAM(S): at 09:29

## 2025-04-21 RX ADMIN — KETOROLAC TROMETHAMINE 15 MILLIGRAM(S): 30 INJECTION, SOLUTION INTRAMUSCULAR; INTRAVENOUS at 12:50

## 2025-04-21 RX ADMIN — Medication 1000 MILLILITER(S): at 10:30

## 2025-04-21 NOTE — ED PROVIDER NOTE - CLINICAL SUMMARY MEDICAL DECISION MAKING FREE TEXT BOX
76 y/o M hx of afib (non complaint w/ blood thinners), on methodone, prior substance abuse presents w/ abdominal pain. generalized in nature for 3 days. endorsing 5 loose watery bowel movements per day, denies blood. denies chest pain/sob .denies nausea/vomiting. denies fever/chills. took his methadone this morning.   no significant tenderness on exam  well appearing  ct scan - r/o obstruction, colitis, appendicitis, diverticulitis. differential not limited to above. consider gastritis/pancreatitis  pain meds    patient endorsing continued pain, CTA added w/ lactate, consider mesenteric ischemia given non complaint w/ his blood thinners. patient then endorsed that his pain resolved and curran snot want the CTA anymore. patient AMAed, well appearing, not in acute distress. tolerated Po intake. return precautions discussed. incidental findings discussed regarding cirrhosis and lung nodule discussed w/ patient - he is aware, f/u with PCP discussed.     The pt has demonstrated concrete thinking/reasoning, has maintained an orderly/reasonable conversation, appears to have intact insight/judgment/reason and therefore in our opinion has capacity to make decisions. The pt verbalized an understanding of our worries. We’ve told the patient that the hospital evaluation is incomplete & many troublesome conditions haven’t  been r/o. We have discussed the need for further inpatient w/u so we can get more information. We have discussed the range of possible dx, potential testing & tx options. We’ve made  numerous efforts to prevent the pt from leaving AMA.  Our discussions included the potential outcomes of leaving AMA, including worsening of their condition, becoming permanently disabled/in pain/critically ill, or death.  Despite these efforts, we were unable to convince the pt to stay. The pt is refusing any  further care and is leaving against medical advice. We have attempted to offer tx/rx/guidance for any dangerous conditions which are most likely and/or dangerous.  We have answered all questions and have implored the pt to return ASAP to complete the w/u.

## 2025-04-21 NOTE — ED PROVIDER NOTE - OBJECTIVE STATEMENT
74 y/o M hx of afib (non complaint w/ blood thinners), on methodone, prior substance abuse presents w/ abdominal pain. generalized in nature for 3 days. endorsing 5 loose watery bowel movements per day, denies blood. denies chest pain/sob .denies nausea/vomiting. denies fever/chills. took his methadone this morning.

## 2025-04-21 NOTE — ED PROVIDER NOTE - NSFOLLOWUPINSTRUCTIONS_ED_ALL_ED_FT
followup with primary care in next 7 days and for CT imaging for nodule in chest as discussed.   can take acetaminophen or ibuprofen over the counter for pain only as needed.   Return to ER immediately if you change your mind about further workup.     Please return to the emergency department immediately should you feel worse in any way or have any of the following symptoms:    •	especially increased or different pain  •	 fevers  •	persistent vomiting  •	shaking chills     Please follow up with the Doctor listed within the time frame specified. Thank you for coming to the emergency department. We hope you are feeling improved and continue to get better. Have a nice day.

## 2025-04-21 NOTE — ED ADULT TRIAGE NOTE - CHIEF COMPLAINT QUOTE
pt c/o mid abdominal pain with diarrhea for 3 days. states he thinks it's  related to taking methadone. last dose was this morning. history of CHF and drug abuse.

## 2025-04-21 NOTE — ED PROVIDER NOTE - PATIENT PORTAL LINK FT
You can access the FollowMyHealth Patient Portal offered by [facility] by registering at the following website: [email or web address]. By joining [email or web address]’s FollowMyHealth portal, you will also be able to view your health information using other applications (apps) compatible with our system.

## 2025-04-21 NOTE — ED PROVIDER NOTE - CARE PROVIDER_API CALL
Pepe Almaguer  Internal Medicine  [address] [place] [address]  Phone: [phone]  Fax: [phone]  Follow Up Time: 7-10 Days